# Patient Record
Sex: FEMALE | Race: WHITE | NOT HISPANIC OR LATINO | Employment: PART TIME | URBAN - METROPOLITAN AREA
[De-identification: names, ages, dates, MRNs, and addresses within clinical notes are randomized per-mention and may not be internally consistent; named-entity substitution may affect disease eponyms.]

---

## 2017-05-08 LAB — POC BETA-HCG (QUAL): NEGATIVE

## 2018-07-09 LAB — POC BETA-HCG (QUAL): NEGATIVE

## 2018-07-10 ENCOUNTER — HISTORICAL (OUTPATIENT)
Dept: LAB | Facility: HOSPITAL | Age: 23
End: 2018-07-10

## 2019-06-26 ENCOUNTER — HISTORICAL (OUTPATIENT)
Dept: ADMINISTRATIVE | Facility: HOSPITAL | Age: 24
End: 2019-06-26

## 2019-07-02 LAB — FINAL CULTURE: NORMAL

## 2019-07-08 ENCOUNTER — HISTORICAL (OUTPATIENT)
Dept: ADMINISTRATIVE | Facility: HOSPITAL | Age: 24
End: 2019-07-08

## 2019-07-10 LAB — FINAL CULTURE: NORMAL

## 2020-07-22 ENCOUNTER — HISTORICAL (OUTPATIENT)
Dept: ADMINISTRATIVE | Facility: HOSPITAL | Age: 25
End: 2020-07-22

## 2020-07-24 LAB — FINAL CULTURE: NORMAL

## 2021-02-10 ENCOUNTER — HISTORICAL (OUTPATIENT)
Dept: ADMINISTRATIVE | Facility: HOSPITAL | Age: 26
End: 2021-02-10

## 2021-02-11 LAB — FINAL CULTURE: NORMAL

## 2022-04-11 ENCOUNTER — HISTORICAL (OUTPATIENT)
Dept: ADMINISTRATIVE | Facility: HOSPITAL | Age: 27
End: 2022-04-11

## 2022-04-27 VITALS
HEIGHT: 62 IN | SYSTOLIC BLOOD PRESSURE: 128 MMHG | DIASTOLIC BLOOD PRESSURE: 89 MMHG | BODY MASS INDEX: 52.54 KG/M2 | WEIGHT: 285.5 LBS | OXYGEN SATURATION: 99 %

## 2022-06-16 ENCOUNTER — HOSPITAL ENCOUNTER (OUTPATIENT)
Dept: RADIOLOGY | Facility: HOSPITAL | Age: 27
Discharge: HOME | End: 2022-06-16
Attending: OBSTETRICS & GYNECOLOGY
Payer: MEDICAID

## 2022-06-16 DIAGNOSIS — R10.2 PELVIC PAIN: ICD-10-CM

## 2022-06-16 PROCEDURE — 76856 US EXAM PELVIC COMPLETE: CPT | Mod: TC

## 2022-09-21 ENCOUNTER — HISTORICAL (OUTPATIENT)
Dept: ADMINISTRATIVE | Facility: HOSPITAL | Age: 27
End: 2022-09-21
Payer: MEDICAID

## 2022-11-05 ENCOUNTER — HOSPITAL ENCOUNTER (EMERGENCY)
Facility: HOSPITAL | Age: 27
Discharge: HOME | End: 2022-11-05
Attending: STUDENT IN AN ORGANIZED HEALTH CARE EDUCATION/TRAINING PROGRAM
Payer: MEDICAID

## 2022-11-05 VITALS
DIASTOLIC BLOOD PRESSURE: 81 MMHG | SYSTOLIC BLOOD PRESSURE: 133 MMHG | RESPIRATION RATE: 18 BRPM | TEMPERATURE: 98 F | BODY MASS INDEX: 39.07 KG/M2 | WEIGHT: 215 LBS | HEART RATE: 89 BPM | OXYGEN SATURATION: 99 %

## 2022-11-05 DIAGNOSIS — K08.89 PAIN, DENTAL: Primary | ICD-10-CM

## 2022-11-05 PROCEDURE — 25000003 PHARM REV CODE 250: Performed by: STUDENT IN AN ORGANIZED HEALTH CARE EDUCATION/TRAINING PROGRAM

## 2022-11-05 PROCEDURE — 99284 EMERGENCY DEPT VISIT MOD MDM: CPT

## 2022-11-05 RX ORDER — CHLORHEXIDINE GLUCONATE ORAL RINSE 1.2 MG/ML
15 SOLUTION DENTAL 2 TIMES DAILY
Qty: 473 ML | Refills: 0 | Status: SHIPPED | OUTPATIENT
Start: 2022-11-05 | End: 2022-11-12

## 2022-11-05 RX ORDER — TRAMADOL HYDROCHLORIDE 50 MG/1
50 TABLET ORAL
Status: COMPLETED | OUTPATIENT
Start: 2022-11-05 | End: 2022-11-05

## 2022-11-05 RX ORDER — CLINDAMYCIN HYDROCHLORIDE 150 MG/1
300 CAPSULE ORAL
Status: COMPLETED | OUTPATIENT
Start: 2022-11-05 | End: 2022-11-05

## 2022-11-05 RX ORDER — AMOXICILLIN AND CLAVULANATE POTASSIUM 875; 125 MG/1; MG/1
1 TABLET, FILM COATED ORAL ONCE
Status: DISCONTINUED | OUTPATIENT
Start: 2022-11-05 | End: 2022-11-05

## 2022-11-05 RX ORDER — AMOXICILLIN AND CLAVULANATE POTASSIUM 875; 125 MG/1; MG/1
1 TABLET, FILM COATED ORAL EVERY 12 HOURS
Qty: 14 TABLET | Refills: 0 | Status: SHIPPED | OUTPATIENT
Start: 2022-11-05 | End: 2022-11-05 | Stop reason: ALTCHOICE

## 2022-11-05 RX ORDER — TRAMADOL HYDROCHLORIDE 50 MG/1
50 TABLET ORAL EVERY 6 HOURS PRN
Qty: 7 TABLET | Refills: 0 | OUTPATIENT
Start: 2022-11-05 | End: 2024-02-10

## 2022-11-05 RX ORDER — CLINDAMYCIN HYDROCHLORIDE 150 MG/1
450 CAPSULE ORAL EVERY 8 HOURS
Qty: 63 CAPSULE | Refills: 0 | Status: SHIPPED | OUTPATIENT
Start: 2022-11-05 | End: 2022-11-12

## 2022-11-05 RX ADMIN — TRAMADOL HYDROCHLORIDE 50 MG: 50 TABLET, COATED ORAL at 08:11

## 2022-11-05 RX ADMIN — CLINDAMYCIN HYDROCHLORIDE 300 MG: 150 CAPSULE ORAL at 08:11

## 2022-11-06 NOTE — ED PROVIDER NOTES
Encounter Date: 11/5/2022       History     Chief Complaint   Patient presents with    Dental Pain     Pt complaining of left side dental pain with facial swelling.      Patient is a 27-year-old white female past medical history of asthma presented to the ER today due to dental pain.  Patient states has been intermittently happening for the last 1 year.  Patient is not seated numbness in 5 years.  Patient states the pain is unbearable for the last 3 days.  Patient has been using Orajel with no relief.  Patient denies any notable fevers, chills, cough, congestion, chest pain, shortness of breath, abdominal pain.  Patient states the area affected his left lower mandible.    Review of patient's allergies indicates:   Allergen Reactions    Ceftibuten Swelling    Ceftriaxone Swelling     Other reaction(s): Swelling    Shellfish containing products Swelling    Strawberry Swelling    Peanut     Soy     Cefprozil Rash    Iodine Rash     No past medical history on file.  Past Surgical History:   Procedure Laterality Date    CHOLECYSTECTOMY       No family history on file.  Social History     Tobacco Use    Smoking status: Never    Smokeless tobacco: Never     Review of Systems   Constitutional:  Negative for chills, fatigue and fever.   HENT:  Positive for dental problem. Negative for congestion, sore throat and trouble swallowing.    Eyes:  Negative for pain and visual disturbance.   Respiratory:  Negative for cough, shortness of breath and wheezing.    Cardiovascular:  Negative for chest pain and palpitations.   Gastrointestinal:  Negative for abdominal pain, blood in stool, constipation, diarrhea, nausea and vomiting.   Genitourinary:  Negative for dysuria and hematuria.   Musculoskeletal:  Negative for back pain and myalgias.   Skin:  Negative for rash and wound.   Neurological:  Negative for seizures, syncope and headaches.   Psychiatric/Behavioral:  Negative for confusion. The patient is not nervous/anxious.       Physical Exam     Initial Vitals [11/05/22 2017]   BP Pulse Resp Temp SpO2   133/81 89 18 97.9 °F (36.6 °C) 99 %      MAP       --         Physical Exam    Nursing note and vitals reviewed.  Constitutional: She appears well-developed and well-nourished. She is not diaphoretic. No distress.   HENT:   Head: Normocephalic.   Right Ear: External ear normal.   Left Ear: External ear normal.   Nose: Nose normal.   Mouth:  Slightly erythematous gums below the left inferior posterior mandible.  No notable fluctuance on exam.  No cervical adenopathy.   Eyes: Conjunctivae and EOM are normal. Right eye exhibits no discharge. Left eye exhibits no discharge. No scleral icterus.   Neck:   Normal range of motion.  Cardiovascular:  Normal rate, regular rhythm and normal heart sounds.     Exam reveals no gallop and no friction rub.       No murmur heard.  Pulmonary/Chest: Breath sounds normal. No stridor. No respiratory distress. She has no wheezes. She has no rhonchi. She has no rales.   Musculoskeletal:         General: No tenderness or edema. Normal range of motion.      Cervical back: Normal range of motion.     Neurological: She is alert. No cranial nerve deficit.   Skin: Skin is warm. No rash noted. No erythema.   Psychiatric: She has a normal mood and affect. Her behavior is normal.       ED Course   Procedures  Labs Reviewed - No data to display       Imaging Results    None          Medications   clindamycin capsule 300 mg (has no administration in time range)   traMADoL tablet 50 mg (50 mg Oral Given 11/5/22 2042)     Medical Decision Making:   Initial Assessment:   Overall well-appearing 27-year-old female  Differential Diagnosis:   Dental pain, dental caries, dental abscess  ED Management:  Vital signs stable patient is afebrile   Findings on physical exam seem most consistent with a dental infection   Patient has a ride home and requesting pain medicine   Patient states no way that she is pregnant understands the  risk associated with it   Tramadol given here in the ER as well as clindamycin  There is a cephalosporin allergy in the chart patient denies this has ever been the case  Patient also states that she has had Augmentin in the past no allergic reactions   Went back and discussed with patient her allergys and she is unsure if she ever had an Abx, switched to clindamycin  Advised her not to  augmentin at pharmacy, and she agreed  clinda sent pharmacy as well as Peridex   Seven tramadol tablet sent to the pharmacy as well  Follow-up with dentist on Monday as recommended   Return precautions were discussed                        Clinical Impression:   Final diagnoses:  [K08.89] Pain, dental (Primary)        ED Disposition Condition    Discharge Stable          ED Prescriptions       Medication Sig Dispense Start Date End Date Auth. Provider    amoxicillin-clavulanate 875-125mg (AUGMENTIN) 875-125 mg per tablet  (Status: Discontinued) Take 1 tablet by mouth every 12 (twelve) hours. for 7 days 14 tablet 11/5/2022 11/5/2022 James Fry MD    chlorhexidine (PERIDEX) 0.12 % solution Use as directed 15 mLs in the mouth or throat 2 (two) times daily. for 7 days 473 mL 11/5/2022 11/12/2022 James Fry MD    traMADoL (ULTRAM) 50 mg tablet Take 1 tablet (50 mg total) by mouth every 6 (six) hours as needed for Pain. 7 tablet 11/5/2022 -- James Fry MD    clindamycin (CLEOCIN) 150 MG capsule Take 3 capsules (450 mg total) by mouth every 8 (eight) hours. for 7 days 63 capsule 11/5/2022 11/12/2022 James Fry MD          Follow-up Information       Follow up With Specialties Details Why Contact Info    [facility] - Emergency Dept Emergency Medicine  If symptoms worsen [address] [facility] [address] [phone]    Sumi Santos MD Internal Medicine Schedule an appointment as soon as possible for a visit   [address] [place] [address]  [place] [address]  [phone]                James Fry MD  11/05/22 2040       James Fry MD  11/05/22 2050

## 2023-06-06 ENCOUNTER — HOSPITAL ENCOUNTER (EMERGENCY)
Facility: HOSPITAL | Age: 28
Discharge: HOME | End: 2023-06-06
Attending: SPECIALIST
Payer: MEDICAID

## 2023-06-06 VITALS
HEIGHT: 62 IN | BODY MASS INDEX: 39.56 KG/M2 | HEART RATE: 100 BPM | DIASTOLIC BLOOD PRESSURE: 90 MMHG | SYSTOLIC BLOOD PRESSURE: 149 MMHG | WEIGHT: 215 LBS | TEMPERATURE: 97 F | OXYGEN SATURATION: 98 % | RESPIRATION RATE: 20 BRPM

## 2023-06-06 DIAGNOSIS — S60.222A CONTUSION OF LEFT HAND, INITIAL ENCOUNTER: Primary | ICD-10-CM

## 2023-06-06 PROCEDURE — 99283 EMERGENCY DEPT VISIT LOW MDM: CPT

## 2023-06-06 PROCEDURE — 25000003 PHARM REV CODE 250

## 2023-06-06 RX ORDER — HYDROCODONE BITARTRATE AND ACETAMINOPHEN 10; 325 MG/1; MG/1
1 TABLET ORAL
Status: COMPLETED | OUTPATIENT
Start: 2023-06-06 | End: 2023-06-06

## 2023-06-06 RX ORDER — DICLOFENAC SODIUM 50 MG/1
50 TABLET, DELAYED RELEASE ORAL 2 TIMES DAILY
Qty: 10 TABLET | Refills: 0 | OUTPATIENT
Start: 2023-06-06 | End: 2024-02-10

## 2023-06-06 RX ORDER — ONDANSETRON 4 MG/1
4 TABLET, ORALLY DISINTEGRATING ORAL
Status: COMPLETED | OUTPATIENT
Start: 2023-06-06 | End: 2023-06-06

## 2023-06-06 RX ADMIN — HYDROCODONE BITARTRATE AND ACETAMINOPHEN 1 TABLET: 10; 325 TABLET ORAL at 08:06

## 2023-06-06 RX ADMIN — ONDANSETRON 4 MG: 4 TABLET, ORALLY DISINTEGRATING ORAL at 08:06

## 2023-06-07 NOTE — DISCHARGE INSTRUCTIONS
Patient to be discharged home keep hand red apply cool compress.  Anti-inflammatories for pain.  Elevate and rest.  Keep Ace wrap on for comfort

## 2023-06-07 NOTE — ED PROVIDER NOTES
Encounter Date: 6/6/2023       History     Chief Complaint   Patient presents with    Hand Pain     Left hand pain after dog leash got caught in it as dog was running     Patient reports ER with complaints of left hand pain    The history is provided by the patient. No  was used.   Review of patient's allergies indicates:   Allergen Reactions    Ceftibuten Swelling    Ceftriaxone Swelling     Other reaction(s): Swelling    Shellfish containing products Swelling    Strawberry Swelling    Peanut     Soy     Cefprozil Rash    Iodine Rash     No past medical history on file.  Past Surgical History:   Procedure Laterality Date    CHOLECYSTECTOMY       No family history on file.  Social History     Tobacco Use    Smoking status: Never    Smokeless tobacco: Never     Review of Systems   Constitutional: Negative.    HENT: Negative.     Eyes: Negative.    Respiratory: Negative.     Cardiovascular: Negative.    Gastrointestinal: Negative.    Endocrine: Negative.    Genitourinary: Negative.    Musculoskeletal:  Positive for joint swelling and myalgias.   Skin: Negative.    Allergic/Immunologic: Negative.    Neurological: Negative.    Hematological: Negative.    Psychiatric/Behavioral: Negative.     All other systems reviewed and are negative.    Physical Exam     Initial Vitals [06/06/23 2000]   BP Pulse Resp Temp SpO2   (!) 149/90 100 18 97.2 °F (36.2 °C) 98 %      MAP       --         Physical Exam    Nursing note and vitals reviewed.  Constitutional: She appears well-developed and well-nourished.   HENT:   Head: Normocephalic and atraumatic.   Right Ear: External ear normal.   Left Ear: External ear normal.   Nose: Nose normal.   Mouth/Throat: Oropharynx is clear and moist.   Eyes: Conjunctivae and EOM are normal. Pupils are equal, round, and reactive to light.   Neck: Neck supple.   Normal range of motion.  Cardiovascular:  Normal rate, regular rhythm, normal heart sounds and intact distal pulses.            Pulmonary/Chest: Breath sounds normal.   Abdominal: Abdomen is soft. Bowel sounds are normal.   Musculoskeletal:         General: Tenderness present.      Cervical back: Normal range of motion and neck supple.     Neurological: She is alert and oriented to person, place, and time. She has normal strength and normal reflexes. GCS score is 15. GCS eye subscore is 4. GCS verbal subscore is 5. GCS motor subscore is 6.   Skin: Skin is warm. Capillary refill takes less than 2 seconds. There is erythema.   Psychiatric: She has a normal mood and affect. Her behavior is normal. Judgment and thought content normal.       ED Course   Procedures  Labs Reviewed - No data to display       Imaging Results              X-Ray Hand 3 view Left (Final result)  Result time 06/06/23 20:18:22      Final result by Brian Jo MD (06/06/23 20:18:22)                   Impression:      No acute osseous abnormality, fracture, or dislocation.    There is no significant degenerative change.      Electronically signed by: Brian Jo  Date:    06/06/2023  Time:    20:18               Narrative:    EXAMINATION:  XR HAND COMPLETE 3 VIEW LEFT    CLINICAL HISTORY:  traum;    TECHNIQUE:  Radiographs of the left hand with AP, lateral and oblique  views.    COMPARISON:  No prior imaging available for comparison    FINDINGS:  There is no acute fracture, subluxation or dislocation.    Joints and interspaces appear maintained.    Osseous structures show normal bone mineral density.    Soft tissues are unremarkable.    There are no radiopaque foreign bodies.                                       Medications   HYDROcodone-acetaminophen  mg per tablet 1 tablet (1 tablet Oral Given 6/6/23 2023)   ondansetron disintegrating tablet 4 mg (4 mg Oral Given 6/6/23 2023)     Medical Decision Making:   Initial Assessment:   Awake alert oriented 28-year-old female presents emergency room with complaints of left hand pain significantly the left ring  finger after patient was walking dog and the dog ran and the leash wrapped her hand.   Patient is no acute distress.  Ambulatory gait steady no use of accessory devices.  The left hand is lightly swollen wore than the right especially on the palmar side.  Fall abrasions noted to the metatarsal the left hand.  No bruising open wounds bleeding.  Some soft tissue swelling.  No obvious deformity no tenderness with palpation to the scaphoid area.  Patient is able to fan fingers and perform finger-to-finger touch.  Distal pulse is present.  GCS of 15 neuro focal intact  Differential Diagnosis:   Hand sprain  Hand contusion   Fracture  Clinical Tests:   Radiological Study: Ordered  ED Management:  Xray:  No acute osseous abnormality, fracture, or dislocation.     There is no significant degenerative change.  Patient will be discharged home to follow up with orthopedist if symptoms continue.                          Clinical Impression:   Final diagnoses:  [S60.222A] Contusion of left hand, initial encounter (Primary)        ED Disposition Condition    Discharge Stable          ED Prescriptions       Medication Sig Dispense Start Date End Date Auth. Provider    diclofenac (VOLTAREN) 50 MG EC tablet Take 1 tablet (50 mg total) by mouth 2 (two) times daily. 10 tablet 6/6/2023 -- Ivet Jensen NP          Follow-up Information       Follow up With Specialties Details Why Contact Info    Sumi Santos MD Internal Medicine  As needed [address] [place] [address]  [phone]               Ivet Jensen NP  06/06/23 2042

## 2023-06-28 ENCOUNTER — HOSPITAL ENCOUNTER (EMERGENCY)
Facility: HOSPITAL | Age: 28
Discharge: HOME | End: 2023-06-28
Attending: STUDENT IN AN ORGANIZED HEALTH CARE EDUCATION/TRAINING PROGRAM
Payer: MEDICAID

## 2023-06-28 VITALS
HEART RATE: 78 BPM | WEIGHT: 215 LBS | HEIGHT: 62 IN | OXYGEN SATURATION: 98 % | RESPIRATION RATE: 18 BRPM | DIASTOLIC BLOOD PRESSURE: 80 MMHG | BODY MASS INDEX: 39.56 KG/M2 | TEMPERATURE: 98 F | SYSTOLIC BLOOD PRESSURE: 138 MMHG

## 2023-06-28 DIAGNOSIS — S16.1XXA STRAIN OF NECK MUSCLE, INITIAL ENCOUNTER: Primary | ICD-10-CM

## 2023-06-28 DIAGNOSIS — N30.00 ACUTE CYSTITIS WITHOUT HEMATURIA: ICD-10-CM

## 2023-06-28 DIAGNOSIS — R52 PAIN: ICD-10-CM

## 2023-06-28 LAB
APPEARANCE UR: CLEAR
B-HCG SERPL QL: NEGATIVE
BACTERIA #/AREA URNS AUTO: ABNORMAL /HPF
BILIRUB UR QL STRIP.AUTO: NEGATIVE MG/DL
COLOR UR: YELLOW
GLUCOSE UR QL STRIP.AUTO: NEGATIVE MG/DL
KETONES UR QL STRIP.AUTO: NEGATIVE MG/DL
LEUKOCYTE ESTERASE UR QL STRIP.AUTO: ABNORMAL UNIT/L
NITRITE UR QL STRIP.AUTO: NEGATIVE
PH UR STRIP.AUTO: 6 [PH]
PROT UR QL STRIP.AUTO: ABNORMAL MG/DL
RBC #/AREA URNS AUTO: ABNORMAL /HPF
RBC UR QL AUTO: NEGATIVE UNIT/L
SP GR UR STRIP.AUTO: >=1.03
SQUAMOUS #/AREA URNS AUTO: ABNORMAL /HPF
UROBILINOGEN UR STRIP-ACNC: 0.2 MG/DL
WBC #/AREA URNS AUTO: ABNORMAL /HPF

## 2023-06-28 PROCEDURE — 63600175 PHARM REV CODE 636 W HCPCS: Performed by: STUDENT IN AN ORGANIZED HEALTH CARE EDUCATION/TRAINING PROGRAM

## 2023-06-28 PROCEDURE — 81001 URINALYSIS AUTO W/SCOPE: CPT | Performed by: STUDENT IN AN ORGANIZED HEALTH CARE EDUCATION/TRAINING PROGRAM

## 2023-06-28 PROCEDURE — 99284 EMERGENCY DEPT VISIT MOD MDM: CPT

## 2023-06-28 PROCEDURE — 81025 URINE PREGNANCY TEST: CPT | Performed by: STUDENT IN AN ORGANIZED HEALTH CARE EDUCATION/TRAINING PROGRAM

## 2023-06-28 PROCEDURE — 96372 THER/PROPH/DIAG INJ SC/IM: CPT | Performed by: STUDENT IN AN ORGANIZED HEALTH CARE EDUCATION/TRAINING PROGRAM

## 2023-06-28 RX ORDER — KETOROLAC TROMETHAMINE 10 MG/1
10 TABLET, FILM COATED ORAL 3 TIMES DAILY
Qty: 15 TABLET | Refills: 0 | Status: SHIPPED | OUTPATIENT
Start: 2023-06-28 | End: 2023-07-03

## 2023-06-28 RX ORDER — KETOROLAC TROMETHAMINE 30 MG/ML
30 INJECTION, SOLUTION INTRAMUSCULAR; INTRAVENOUS
Status: COMPLETED | OUTPATIENT
Start: 2023-06-28 | End: 2023-06-28

## 2023-06-28 RX ORDER — NITROFURANTOIN 25; 75 MG/1; MG/1
100 CAPSULE ORAL 2 TIMES DAILY
Qty: 14 CAPSULE | Refills: 0 | Status: SHIPPED | OUTPATIENT
Start: 2023-06-28 | End: 2023-07-05

## 2023-06-28 RX ORDER — METHOCARBAMOL 750 MG/1
750 TABLET, FILM COATED ORAL 3 TIMES DAILY PRN
Qty: 15 TABLET | Refills: 0 | Status: SHIPPED | OUTPATIENT
Start: 2023-06-28 | End: 2023-07-03

## 2023-06-28 RX ADMIN — KETOROLAC TROMETHAMINE 30 MG: 30 INJECTION, SOLUTION INTRAMUSCULAR; INTRAVENOUS at 11:06

## 2023-06-28 NOTE — ED PROVIDER NOTES
Encounter Date: 6/28/2023       History     Chief Complaint   Patient presents with    Back Pain     Pt reports pain from top of neck down to lower back for one week     Patient is a 28-year-old white female past medical history of asthma who presented to the ER today due to neck pain for the last 1 week.  Patient states that it was present before that as well that is states that it seemed to worsen over the last 1 week.  Patient states she is taken only Tylenol for without much relief.  Patient denies any interval trauma.  Patient states the pain is worse with movement and better with rest.  Patient states it is a burning/sharp pain.  Patient states that often times radiates down or mid back.  Patient denies any fevers, chills, cough, congestion, chest pain, shortness of breath abdominal pain.  Patient denies any dysuria or hematuria.  Patient states she is not been evaluated for this in the past.    Review of patient's allergies indicates:   Allergen Reactions    Ceftibuten Swelling    Ceftriaxone Swelling     Other reaction(s): Swelling    Shellfish containing products Swelling    Strawberry Swelling    Peanut     Soy     Cefprozil Rash    Iodine Rash     No past medical history on file.  Past Surgical History:   Procedure Laterality Date    CHOLECYSTECTOMY       No family history on file.  Social History     Tobacco Use    Smoking status: Never    Smokeless tobacco: Never     Review of Systems   Constitutional:  Negative for chills, fatigue and fever.   HENT:  Negative for congestion, sore throat and trouble swallowing.    Eyes:  Negative for pain and visual disturbance.   Respiratory:  Negative for cough, shortness of breath and wheezing.    Cardiovascular:  Negative for chest pain and palpitations.   Gastrointestinal:  Negative for abdominal pain, blood in stool, constipation, diarrhea, nausea and vomiting.   Genitourinary:  Negative for dysuria and hematuria.   Musculoskeletal:  Positive for back pain and  neck pain. Negative for myalgias.   Skin:  Negative for rash and wound.   Neurological:  Negative for seizures, syncope and headaches.   Psychiatric/Behavioral:  Negative for confusion. The patient is not nervous/anxious.      Physical Exam     Initial Vitals [06/28/23 1019]   BP Pulse Resp Temp SpO2   138/80 78 18 98.4 °F (36.9 °C) 98 %      MAP       --         Physical Exam    Nursing note and vitals reviewed.  Constitutional: She appears well-developed and well-nourished. She is not diaphoretic. No distress.   HENT:   Head: Normocephalic.   Right Ear: External ear normal.   Left Ear: External ear normal.   Nose: Nose normal.   Eyes: Conjunctivae and EOM are normal. Right eye exhibits no discharge. Left eye exhibits no discharge. No scleral icterus.   Neck:   Normal range of motion.  Cardiovascular:  Normal rate, regular rhythm and normal heart sounds.     Exam reveals no gallop and no friction rub.       No murmur heard.  Pulmonary/Chest: Breath sounds normal. No stridor. No respiratory distress. She has no wheezes. She has no rhonchi. She has no rales.   Abdominal: Abdomen is soft. She exhibits no distension. There is no abdominal tenderness. There is no rebound and no guarding.   Musculoskeletal:         General: Normal range of motion.      Cervical back: Normal range of motion.      Comments: There is no C, T, L-spine tenderness no step-off lesions.  Tenderness palpation in the cervical paraspinal regions but also in the thoracic paraspinal regions.  No rashes on exam.  No signs of trauma on exam.  No nuchal rigidity on exam.     Neurological: She is alert.   Skin: Skin is warm. No rash noted. No erythema.   Psychiatric: She has a normal mood and affect. Her behavior is normal.       ED Course   Procedures  Labs Reviewed   URINALYSIS, REFLEX TO URINE CULTURE - Abnormal; Notable for the following components:       Result Value    Protein, UA Trace (*)     Leukocyte Esterase, UA Trace (*)     All other  components within normal limits   URINALYSIS, MICROSCOPIC - Abnormal; Notable for the following components:    Bacteria, UA Few (*)     Squamous Epithelial Cells, UA Few (*)     All other components within normal limits   HCG QUALITATIVE URINE - Normal          Imaging Results              X-Ray Cervical Spine AP And Lateral (Final result)  Result time 06/28/23 12:08:48      Final result by Sheldon Camilo MD (06/28/23 12:08:48)                   Impression:      No acute radiographic findings.      Electronically signed by: Sheldon Camilo  Date:    06/28/2023  Time:    12:08               Narrative:    EXAMINATION:  XR CERVICAL SPINE AP LATERAL    CLINICAL HISTORY:  Pain, unspecified    COMPARISON:  No priors    FINDINGS:  Frontal, lateral and open mouth views of the cervical spine. There is no significant alignment abnormality.  The odontoid appears grossly intact and the C1-C2 relationship normal on the open mouth view. There is no prevertebral soft tissue swelling.                                       Medications   ketorolac injection 30 mg (30 mg Intramuscular Given 6/28/23 1132)     Medical Decision Making:   Initial Assessment:   Overall well-appearing 28-year-old female no acute distress seems to have full range of motion on exam of her neck.  Differential Diagnosis:   Cervical strain, fracture, radiculopathy, zoster, rash, meningitis  Clinical Tests:   Lab Tests: Ordered and Reviewed  The following lab test(s) were unremarkable: UPT and Urinalysis  Radiological Study: Ordered and Reviewed  ED Management:  Vital signs stable patient is afebrile  Denies all red flag symptoms of back pain  No rashes on exam   No nuchal rigidity on exam   X-ray showed no acute osseous abnormalities   Do suspect cervical muscle strain   Rice therapy advised   Toradol given and patient states he feels some improvement   Robaxin and Toradol sent to pharmacy  UA somewhat concerning for UTI as is leukocyte and bacteria thus Macrobid  sent pharmacy   All questions were answered in layman's terms and return precautions were discussed                          Clinical Impression:   Final diagnoses:  [R52] Pain  [S16.1XXA] Strain of neck muscle, initial encounter (Primary)  [N30.00] Acute cystitis without hematuria        ED Disposition Condition    Discharge Stable          ED Prescriptions       Medication Sig Dispense Start Date End Date Auth. Provider    methocarbamoL (ROBAXIN) 750 MG Tab Take 1 tablet (750 mg total) by mouth 3 (three) times daily as needed (pain). 15 tablet 6/28/2023 7/3/2023 James Fry MD    ketorolac (TORADOL) 10 mg tablet Take 1 tablet (10 mg total) by mouth 3 (three) times daily. for 5 days 15 tablet 6/28/2023 7/3/2023 James Fry MD    nitrofurantoin, macrocrystal-monohydrate, (MACROBID) 100 MG capsule Take 1 capsule (100 mg total) by mouth 2 (two) times daily. for 7 days 14 capsule 6/28/2023 7/5/2023 James Fry MD          Follow-up Information       Follow up With Specialties Details Why Contact Info    [facility] - Emergency Dept Emergency Medicine  If symptoms worsen [address] [facility] [address] [phone]    Sumi Santos MD Internal Medicine Schedule an appointment as soon as possible for a visit   [address] [place] [address] Rd  [place] [address]  [phone]               James Fry MD  06/28/23 3337

## 2024-02-10 ENCOUNTER — HOSPITAL ENCOUNTER (EMERGENCY)
Facility: HOSPITAL | Age: 29
Discharge: HOME | End: 2024-02-10
Attending: FAMILY MEDICINE
Payer: MEDICAID

## 2024-02-10 VITALS
DIASTOLIC BLOOD PRESSURE: 93 MMHG | HEIGHT: 62 IN | RESPIRATION RATE: 18 BRPM | OXYGEN SATURATION: 97 % | BODY MASS INDEX: 40.48 KG/M2 | WEIGHT: 220 LBS | SYSTOLIC BLOOD PRESSURE: 146 MMHG | HEART RATE: 121 BPM | TEMPERATURE: 100 F

## 2024-02-10 DIAGNOSIS — G89.29 CHRONIC PAIN OF RIGHT KNEE: Primary | ICD-10-CM

## 2024-02-10 DIAGNOSIS — M25.561 CHRONIC PAIN OF RIGHT KNEE: Primary | ICD-10-CM

## 2024-02-10 PROCEDURE — 99284 EMERGENCY DEPT VISIT MOD MDM: CPT | Mod: 25

## 2024-02-10 PROCEDURE — 96372 THER/PROPH/DIAG INJ SC/IM: CPT | Performed by: FAMILY MEDICINE

## 2024-02-10 PROCEDURE — 63600175 PHARM REV CODE 636 W HCPCS: Performed by: FAMILY MEDICINE

## 2024-02-10 RX ORDER — KETOROLAC TROMETHAMINE 30 MG/ML
60 INJECTION, SOLUTION INTRAMUSCULAR; INTRAVENOUS
Status: COMPLETED | OUTPATIENT
Start: 2024-02-10 | End: 2024-02-10

## 2024-02-10 RX ORDER — DEXAMETHASONE SODIUM PHOSPHATE 4 MG/ML
4 INJECTION, SOLUTION INTRA-ARTICULAR; INTRALESIONAL; INTRAMUSCULAR; INTRAVENOUS; SOFT TISSUE
Status: COMPLETED | OUTPATIENT
Start: 2024-02-10 | End: 2024-02-10

## 2024-02-10 RX ORDER — KETOROLAC TROMETHAMINE 30 MG/ML
60 INJECTION, SOLUTION INTRAMUSCULAR; INTRAVENOUS
Status: DISCONTINUED | OUTPATIENT
Start: 2024-02-10 | End: 2024-02-10

## 2024-02-10 RX ORDER — KETOROLAC TROMETHAMINE 10 MG/1
10 TABLET, FILM COATED ORAL EVERY 6 HOURS
Qty: 15 TABLET | Refills: 0 | Status: SHIPPED | OUTPATIENT
Start: 2024-02-10 | End: 2024-02-15

## 2024-02-10 RX ADMIN — KETOROLAC TROMETHAMINE 60 MG: 30 INJECTION, SOLUTION INTRAMUSCULAR at 10:02

## 2024-02-10 RX ADMIN — DEXAMETHASONE SODIUM PHOSPHATE 4 MG: 4 INJECTION, SOLUTION INTRA-ARTICULAR; INTRALESIONAL; INTRAMUSCULAR; INTRAVENOUS; SOFT TISSUE at 10:02

## 2024-02-10 NOTE — ED PROVIDER NOTES
Encounter Date: 2/10/2024       History     Chief Complaint   Patient presents with    Knee Pain     Pt has right knee pain. States she initially injured knee in 2021 and was supposed to have surgery on soft tissue but her work [employer] compensation ran out.  She took a muscle relaxer last night without relief. Describes as burning pain in knee.      28-year-old complains of right knee pain in his he hurt it in 2021 at work and has been having pain ever since has been having trouble getting follow up appointments with the worker's comp denies any new injuries she just requesting something to help with the pain her vital signs were stable slightly tachycardic most likely from the pain on exam there was no edema she does have some tenderness to palpation to the inside of the knee around the MCL no fluid that I can tell on exam it did not feel it is any need to x-ray at this point as there is no new injury this is a chronic injury which he is currently seeking help for discussed treatment options for chronic pain with the patient we will put her on some crutches just to take the weight off of it for now I would recommend she follows up with physical therapy would recommend weight loss as this would help a lot with the pain and we will give a prescription for some anti-inflammatories for a week until she can follow up with the PCP        Review of patient's allergies indicates:   Allergen Reactions    Ceftibuten Swelling    Ceftriaxone Swelling     Other reaction(s): Swelling    Shellfish containing products Swelling    Strawberry Swelling    Peanut     Soy     Cefprozil Rash    Iodine Rash     History reviewed. No pertinent past medical history.  Past Surgical History:   Procedure Laterality Date    CHOLECYSTECTOMY       History reviewed. No pertinent family history.  Social History     Tobacco Use    Smoking status: Never    Smokeless tobacco: Never     Review of Systems   Constitutional:  Negative for fever.   HENT:   Negative for sore throat.    Respiratory:  Negative for shortness of breath.    Cardiovascular:  Negative for chest pain.   Gastrointestinal:  Negative for nausea.   Genitourinary:  Negative for dysuria.   Musculoskeletal:  Negative for back pain.        Knee pain   Skin:  Negative for rash.   Neurological:  Negative for weakness.   Hematological:  Does not bruise/bleed easily.   All other systems reviewed and are negative.      Physical Exam     Initial Vitals [02/10/24 0948]   BP Pulse Resp Temp SpO2   (!) 146/93 (!) 121 18 99.7 °F (37.6 °C) 97 %      MAP       --         Physical Exam    Nursing note and vitals reviewed.  Constitutional: She appears well-developed and well-nourished. She is active.   HENT:   Head: Normocephalic and atraumatic.   Eyes: Conjunctivae, EOM and lids are normal. Pupils are equal, round, and reactive to light.   Neck: Trachea normal and phonation normal. Neck supple. No thyroid mass present.   Normal range of motion.  Cardiovascular:  Normal rate, regular rhythm, normal heart sounds and normal pulses.           Pulmonary/Chest: Breath sounds normal.   Musculoskeletal:         General: Tenderness present.      Cervical back: Normal range of motion and neck supple.     Neurological: She is alert and oriented to person, place, and time. She has normal strength and normal reflexes.   Skin: Skin is warm and intact.   Psychiatric: She has a normal mood and affect. Her speech is normal and behavior is normal. Judgment and thought content normal. Cognition and memory are normal.         ED Course   Procedures  Labs Reviewed - No data to display       Imaging Results    None          Medications   dexAMETHasone injection 4 mg (has no administration in time range)   ketorolac injection 60 mg (has no administration in time range)     Medical Decision Making  28-year-old complains of right knee pain in his he hurt it in 2021 at work and has been having pain ever since has been having trouble  getting follow up appointments with the worker's comp denies any new injuries she just requesting something to help with the pain her vital signs were stable slightly tachycardic most likely from the pain on exam there was no edema she does have some tenderness to palpation to the inside of the knee around the MCL no fluid that I can tell on exam it did not feel it is any need to x-ray at this point as there is no new injury this is a chronic injury which he is currently seeking help for discussed treatment options for chronic pain with the patient we will put her on some crutches just to take the weight off of it for now I would recommend she follows up with physical therapy would recommend weight loss as this would help a lot with the pain and we will give a prescription for some anti-inflammatories for a week until she can follow up with the PCP          Risk  Prescription drug management.  Risk Details: Differential diagnosis:  Chronic right knee pain, obesity worsening knee pain, acute knee strain, internal derangement, degenerative joint disease, arthritis                                      Clinical Impression:  Final diagnoses:  [M25.561, G89.29] Chronic pain of right knee (Primary)          ED Disposition Condition    Discharge Stable          ED Prescriptions       Medication Sig Dispense Start Date End Date Auth. Provider    ketorolac (TORADOL) 10 mg tablet Take 1 tablet (10 mg total) by mouth every 6 (six) hours. for 5 days 15 tablet 2/10/2024 2/15/2024 Lon Grant MD          Follow-up Information    None          Lon Grant MD  02/10/24 8890

## 2024-04-26 ENCOUNTER — HOSPITAL ENCOUNTER (OUTPATIENT)
Dept: RADIOLOGY | Facility: HOSPITAL | Age: 29
Discharge: HOME | End: 2024-04-26
Attending: OBSTETRICS & GYNECOLOGY
Payer: MEDICAID

## 2024-04-26 DIAGNOSIS — R10.2 PELVIC PAIN: ICD-10-CM

## 2024-04-26 DIAGNOSIS — N94.10 PAINFUL COITUS, FEMALE: ICD-10-CM

## 2024-04-26 PROCEDURE — 76856 US EXAM PELVIC COMPLETE: CPT | Mod: TC

## 2024-10-03 ENCOUNTER — HOSPITAL ENCOUNTER (EMERGENCY)
Facility: HOSPITAL | Age: 29
Discharge: HOME | End: 2024-10-03
Attending: STUDENT IN AN ORGANIZED HEALTH CARE EDUCATION/TRAINING PROGRAM
Payer: MEDICAID

## 2024-10-03 VITALS
RESPIRATION RATE: 20 BRPM | DIASTOLIC BLOOD PRESSURE: 84 MMHG | HEIGHT: 63 IN | HEART RATE: 89 BPM | BODY MASS INDEX: 48.9 KG/M2 | SYSTOLIC BLOOD PRESSURE: 135 MMHG | TEMPERATURE: 98 F | OXYGEN SATURATION: 99 % | WEIGHT: 276 LBS

## 2024-10-03 DIAGNOSIS — N39.0 URINARY TRACT INFECTION WITHOUT HEMATURIA, SITE UNSPECIFIED: ICD-10-CM

## 2024-10-03 DIAGNOSIS — R10.9 ABDOMINAL PAIN, UNSPECIFIED ABDOMINAL LOCATION: Primary | ICD-10-CM

## 2024-10-03 LAB
ALBUMIN SERPL-MCNC: 3.8 G/DL (ref 3.5–5)
ALBUMIN/GLOB SERPL: 1 RATIO (ref 1.1–2)
ALP SERPL-CCNC: 72 UNIT/L (ref 40–150)
ALT SERPL-CCNC: 17 UNIT/L (ref 0–55)
ANION GAP SERPL CALC-SCNC: 9 MEQ/L
AST SERPL-CCNC: 18 UNIT/L (ref 5–34)
B-HCG UR QL: NEGATIVE
BACTERIA #/AREA URNS AUTO: ABNORMAL /HPF
BASOPHILS # BLD AUTO: 0.05 X10(3)/MCL
BASOPHILS NFR BLD AUTO: 0.5 %
BILIRUB SERPL-MCNC: 0.4 MG/DL
BILIRUB UR QL STRIP.AUTO: NEGATIVE
BUN SERPL-MCNC: 10.6 MG/DL (ref 7–18.7)
CALCIUM SERPL-MCNC: 9.8 MG/DL (ref 8.4–10.2)
CHLORIDE SERPL-SCNC: 108 MMOL/L (ref 98–107)
CLARITY UR: ABNORMAL
CO2 SERPL-SCNC: 24 MMOL/L (ref 22–29)
COLOR UR AUTO: ABNORMAL
CREAT SERPL-MCNC: 0.97 MG/DL (ref 0.55–1.02)
CREAT/UREA NIT SERPL: 11
EOSINOPHIL # BLD AUTO: 0.18 X10(3)/MCL (ref 0–0.9)
EOSINOPHIL NFR BLD AUTO: 1.7 %
ERYTHROCYTE [DISTWIDTH] IN BLOOD BY AUTOMATED COUNT: 13 % (ref 11.5–17)
GFR SERPLBLD CREATININE-BSD FMLA CKD-EPI: >60 ML/MIN/1.73/M2
GLOBULIN SER-MCNC: 3.8 GM/DL (ref 2.4–3.5)
GLUCOSE SERPL-MCNC: 106 MG/DL (ref 74–100)
GLUCOSE UR QL STRIP: NEGATIVE
HCT VFR BLD AUTO: 44 % (ref 37–47)
HGB BLD-MCNC: 14.4 G/DL (ref 12–16)
HGB UR QL STRIP: ABNORMAL
IMM GRANULOCYTES # BLD AUTO: 0.02 X10(3)/MCL (ref 0–0.04)
IMM GRANULOCYTES NFR BLD AUTO: 0.2 %
KETONES UR QL STRIP: ABNORMAL
LEUKOCYTE ESTERASE UR QL STRIP: ABNORMAL
LIPASE SERPL-CCNC: 16 U/L
LYMPHOCYTES # BLD AUTO: 3.64 X10(3)/MCL (ref 0.6–4.6)
LYMPHOCYTES NFR BLD AUTO: 34.6 %
MCH RBC QN AUTO: 29.4 PG (ref 27–31)
MCHC RBC AUTO-ENTMCNC: 32.7 G/DL (ref 33–36)
MCV RBC AUTO: 89.8 FL (ref 80–94)
MONOCYTES # BLD AUTO: 0.72 X10(3)/MCL (ref 0.1–1.3)
MONOCYTES NFR BLD AUTO: 6.8 %
NEUTROPHILS # BLD AUTO: 5.91 X10(3)/MCL (ref 2.1–9.2)
NEUTROPHILS NFR BLD AUTO: 56.2 %
NITRITE UR QL STRIP: POSITIVE
PH UR STRIP: 5 [PH]
PLATELET # BLD AUTO: 258 X10(3)/MCL (ref 130–400)
PMV BLD AUTO: 11.1 FL (ref 7.4–10.4)
POTASSIUM SERPL-SCNC: 3.9 MMOL/L (ref 3.5–5.1)
PROT SERPL-MCNC: 7.6 GM/DL (ref 6.4–8.3)
PROT UR QL STRIP: 30
RBC # BLD AUTO: 4.9 X10(6)/MCL (ref 4.2–5.4)
RBC #/AREA URNS AUTO: >=100 /HPF
SODIUM SERPL-SCNC: 141 MMOL/L (ref 136–145)
SP GR UR STRIP.AUTO: >=1.03 (ref 1–1.03)
SQUAMOUS #/AREA URNS AUTO: ABNORMAL /HPF
UROBILINOGEN UR STRIP-ACNC: 1
WBC # BLD AUTO: 10.52 X10(3)/MCL (ref 4.5–11.5)
WBC #/AREA URNS AUTO: ABNORMAL /HPF

## 2024-10-03 PROCEDURE — 80053 COMPREHEN METABOLIC PANEL: CPT | Performed by: STUDENT IN AN ORGANIZED HEALTH CARE EDUCATION/TRAINING PROGRAM

## 2024-10-03 PROCEDURE — 25000003 PHARM REV CODE 250: Performed by: EMERGENCY MEDICINE

## 2024-10-03 PROCEDURE — 81025 URINE PREGNANCY TEST: CPT | Performed by: STUDENT IN AN ORGANIZED HEALTH CARE EDUCATION/TRAINING PROGRAM

## 2024-10-03 PROCEDURE — 85025 COMPLETE CBC W/AUTO DIFF WBC: CPT | Performed by: STUDENT IN AN ORGANIZED HEALTH CARE EDUCATION/TRAINING PROGRAM

## 2024-10-03 PROCEDURE — 99283 EMERGENCY DEPT VISIT LOW MDM: CPT

## 2024-10-03 PROCEDURE — 81003 URINALYSIS AUTO W/O SCOPE: CPT | Performed by: STUDENT IN AN ORGANIZED HEALTH CARE EDUCATION/TRAINING PROGRAM

## 2024-10-03 PROCEDURE — 83690 ASSAY OF LIPASE: CPT | Performed by: STUDENT IN AN ORGANIZED HEALTH CARE EDUCATION/TRAINING PROGRAM

## 2024-10-03 PROCEDURE — 81001 URINALYSIS AUTO W/SCOPE: CPT | Performed by: STUDENT IN AN ORGANIZED HEALTH CARE EDUCATION/TRAINING PROGRAM

## 2024-10-03 PROCEDURE — 87086 URINE CULTURE/COLONY COUNT: CPT | Performed by: STUDENT IN AN ORGANIZED HEALTH CARE EDUCATION/TRAINING PROGRAM

## 2024-10-03 RX ORDER — CIPROFLOXACIN 500 MG/1
500 TABLET ORAL
Status: COMPLETED | OUTPATIENT
Start: 2024-10-03 | End: 2024-10-03

## 2024-10-03 RX ADMIN — CIPROFLOXACIN HYDROCHLORIDE 500 MG: 500 TABLET, FILM COATED ORAL at 06:10

## 2024-10-03 NOTE — ED PROVIDER NOTES
Encounter Date: 10/3/2024       History     Chief Complaint   Patient presents with    Abdominal Pain     Complains of generalized abdominal pain, vaginal bleeding, and nausea x 3 weeks     Patient is a 29-year-old white female no significant past medical history presented to the ER today due to abdominal pain for the last 2 weeks.  She states that she has been trying to get pregnant and states she missed her cycle which was supposed to be due 4-5 days ago.  She states that she was wearing a bracelet and a necklace that she bought on [employer] to get pregnant.  She states that upon starting wearing these to impok she states that she developed breast tenderness, abdominal discomfort.  She was not tried anything for relief.  Who states she took a pregnancy test 2 days ago with negative results.  He denies any dysuria or hematuria.  She states last bowel movement was this morning which was normal brown in color with no hematochezia or melena.  She states she was a version to some foods as well.  She denies any chest pain or shortness of breath.      Review of patient's allergies indicates:   Allergen Reactions    Ceftibuten Swelling    Ceftriaxone Swelling     Other reaction(s): Swelling    Shellfish containing products Swelling    Strawberry Swelling    Peanut     Soy     Cefprozil Rash    Iodine Rash     Past Medical History:   Diagnosis Date    Anxiety disorder, unspecified      Past Surgical History:   Procedure Laterality Date    CHOLECYSTECTOMY      TONSILLECTOMY       No family history on file.  Social History     Tobacco Use    Smoking status: Never    Smokeless tobacco: Never     Review of Systems   Constitutional:  Negative for chills, fatigue and fever.   HENT:  Negative for congestion, sore throat and trouble swallowing.    Eyes:  Negative for pain and visual disturbance.   Respiratory:  Negative for cough, shortness of breath and wheezing.    Cardiovascular:  Negative for chest pain and palpitations.    Gastrointestinal:  Positive for abdominal pain. Negative for blood in stool, constipation, diarrhea, nausea and vomiting.   Genitourinary:  Negative for dysuria and hematuria.   Musculoskeletal:  Negative for back pain and myalgias.   Skin:  Negative for rash and wound.   Neurological:  Negative for seizures, syncope and headaches.   Psychiatric/Behavioral:  Negative for confusion. The patient is not nervous/anxious.        Physical Exam     Initial Vitals [10/03/24 1724]   BP Pulse Resp Temp SpO2   135/84 89 20 98.3 °F (36.8 °C) 99 %      MAP       --         Physical Exam    Nursing note and vitals reviewed.  Constitutional: She appears well-developed and well-nourished. She is not diaphoretic. No distress.   HENT:   Head: Normocephalic.   Right Ear: External ear normal.   Left Ear: External ear normal.   Nose: Nose normal.   Eyes: Conjunctivae and EOM are normal. Right eye exhibits no discharge. Left eye exhibits no discharge. No scleral icterus.   Neck:   Normal range of motion.  Cardiovascular:  Normal rate, regular rhythm and normal heart sounds.     Exam reveals no gallop and no friction rub.       No murmur heard.  Pulmonary/Chest: Breath sounds normal. No stridor. No respiratory distress. She has no wheezes. She has no rhonchi. She has no rales.   Abdominal: Abdomen is soft. She exhibits no distension. There is abdominal tenderness. There is no rebound and no guarding.   Musculoskeletal:         General: Normal range of motion.      Cervical back: Normal range of motion.     Neurological: She is alert.   Skin: Skin is warm. No rash noted. No erythema.   Psychiatric: She has a normal mood and affect. Her behavior is normal.         ED Course   Procedures  Labs Reviewed   URINALYSIS, REFLEX TO URINE CULTURE - Abnormal       Result Value    Color, UA Other (*)     Appearance, UA Cloudy (*)     Specific Gravity, UA >=1.030      pH, UA 5.0      Protein, UA 30 (*)     Glucose, UA Negative      Ketones, UA Trace  (*)     Blood, UA Large (*)     Bilirubin, UA Negative      Urobilinogen, UA 1.0      Nitrites, UA Positive (*)     Leukocyte Esterase, UA Small (*)    URINALYSIS, MICROSCOPIC - Abnormal    Bacteria, UA Few (*)     RBC, UA >=100 (*)     WBC, UA 11-20 (*)     Squamous Epithelial Cells, UA Few (*)    COMPREHENSIVE METABOLIC PANEL - Abnormal    Sodium 141      Potassium 3.9      Chloride 108 (*)     CO2 24      Glucose 106 (*)     Blood Urea Nitrogen 10.6      Creatinine 0.97      Calcium 9.8      Protein Total 7.6      Albumin 3.8      Globulin 3.8 (*)     Albumin/Globulin Ratio 1.0 (*)     Bilirubin Total 0.4      ALP 72      ALT 17      AST 18      eGFR >60      Anion Gap 9.0      BUN/Creatinine Ratio 11     CBC WITH DIFFERENTIAL - Abnormal    WBC 10.52      RBC 4.90      Hgb 14.4      Hct 44.0      MCV 89.8      MCH 29.4      MCHC 32.7 (*)     RDW 13.0      Platelet 258      MPV 11.1 (*)     Neut % 56.2      Lymph % 34.6      Mono % 6.8      Eos % 1.7      Basophil % 0.5      Lymph # 3.64      Neut # 5.91      Mono # 0.72      Eos # 0.18      Baso # 0.05      IG# 0.02      IG% 0.2     PREGNANCY TEST, URINE RAPID - Normal    hCG Qualitative, Urine Negative     LIPASE - Normal    Lipase Level 16     CULTURE, URINE   CBC W/ AUTO DIFFERENTIAL    Narrative:     The following orders were created for panel order CBC Auto Differential.  Procedure                               Abnormality         Status                     ---------                               -----------         ------                     CBC with Differential[[id number]]        Abnormal            Final result                 Please view results for these tests on the individual orders.          Imaging Results    None          Medications   ciprofloxacin HCl tablet 500 mg (500 mg Oral Given 10/3/24 7846)     Medical Decision Making  Differentials: Pregnancy, UTI, STI, PID, intra-abdominal infection, viral gastroenteritis, gas pains, constipation,  side-effect from the jewelry patient is wearing   Historian is the patient    Amount and/or Complexity of Data Reviewed  Labs: ordered.               ED Course as of 10/03/24 1908   Thu Oct 03, 2024   1904 Urinalysis, Microscopic(!)  The to multiple allergies we will use a fluoroquinolone to treat the urinary tract infection.  I have advised her that we will have to review cultures and sensitivities once they return. [DB]   1904 I have reviewed the patient's Social Determinants of Health (SDOH), or non-medical factors that may impact their overall health. There are five classifications of SDOH, according to the [place] Office of Disease Prevention and Health Promotion:    Economic stability  Education  Health and healthcare  Neighborhood and built environment  Social and community context    After review of these five areas of determinants, I have not identified any specific barriers to healthcare delivery to this patient at this time.    I have reviewed the nursing notes for the patient and I agree with the assessment in the record. [DB]   1904 The patient is now resting comfortably and feels better after therapies in the ER.  The patient is alert and in no distress. The repeat examination is unremarkable and benign. There is no discomfort at McBurney's point to suggest acute appendicitis.  There is no pulsatile mass or abdominal bruit suggestive of AAA.     The history, exam, diagnostic testing, and current condition also do not suggest a bowel obstruction, acute cholecystitis, bowel perforation, major gastrointestinal bleeding, severe diverticulitis, mesenteric ischemia, volvulus, sepsis, or another significant pathology warrant further testing, continued ED treatment, admission, or surgical evaluation at this point. The vital signs have been stable. The patient does not have uncontrollable pain, intractable vomiting, or other significant symptoms. The patient's condition is stable and appropriate for  discharge from the emergency department.     The patient will pursue a further outpatient evaluation with the primary care physician or other designated or consulting physician, as indicated in the discharge instructions.   [DB]      ED Course User Index  [DB] Sukumar Cooper MD                           Clinical Impression:  Final diagnoses:  [R10.9] Abdominal pain, unspecified abdominal location (Primary)  [N39.0] Urinary tract infection without hematuria, site unspecified          ED Disposition Condition    Discharge Stable          ED Prescriptions    None       Follow-up Information       Follow up With Specialties Details Why Contact Info    Sumi Santos MD Internal Medicine In 1 day For re-evaluation [address] [place] [id number]  [phone]               Sukumar Cooper MD  10/03/24 4223

## 2024-10-04 NOTE — DISCHARGE INSTRUCTIONS
IMPORTANT: EVEN THOUGH WE THINK IT IS SAFE FOR YOU TO GO HOME, NO EVALUATION OR TEST IS PERFECT SO THERE IS ALWAYS A SMALL CHANCE THAT YOU WILL NEED TO RETURN AND BE HOSPITALIZED.  THEREFORE IT IS VERY IMPORTANT THAT IF YOU GET WORSE OR DEVELOP ANY NEW SYMPTOMS THAT YOU RETURN HERE AS SOON AS POSSIBLE TO BE RE-EVALUATED.  THIS INCLUDES THE RETURN OF SYMPTOMS THAT HAVE RESOLVED SUCH AS FAINTING, CHEST PAIN OR SYMPTOMS THAT COULD BE A WARNING FOR A STROKE.     RETURN PRECAUTIONS:         Return here or see/call your doctor if not improving as expected, but return here immediately and/or contact a primary care doctor if you get worse, develop any new symptoms or have any of the following:  - Weakness or changes in speech/vision/coordination   - Shortness of breath, chest pain or fainting  -  One or both legs/feet become swollen, cold or painful  - Vomiting, dark stool, bleeding or trouble urinating  - Fever or chills, or if already present fever >103 or lasting >4-5 days  - New, changing or migrating pain     FOLLOW-UP CARE: We often dont make a definite diagnosis in the ER.  You will need a second opinion if you do not get better and usually even if you do.  Call your doctor and/or any doctors we referred you to for more advice and to make an appointment.  Do this today, tomorrow or after the weekend.  If you have HMO insurance, you may want to contact your HMO or your primary care doctor for referral to a specialist within your plan.  Either way tell the doctors office that it was a referral from the emergency department to get the soonest possible appointment.        YOUR TEST RESULTS: Take result reports of any blood or urine tests, imaging tests and EKGs to your doctor and any referral doctors. Have any abnormal tests repeated.  Your doctor or a referral doctor can let you know when this should be done.  Also make sure your doctor contacts this hospital to get any test results that are not yet available such as  culture results or special tests for infection and final imaging reports, which are often not available at the time you leave the ER but which may list additional important findings that are not on the preliminary report.        BLOOD PRESSURE & GLUCOSE: If your blood pressure was greater than 120/80 or your glucose level was >100 have it rechecked within 1 to 2 weeks.  The stress of your current condition can often cause a temporary rise in blood pressure or glucose level, but many people have undiagnosed hypertension or pre-diabetes.        MEDICATION SIDE EFFECTS: Do not drive, walk, bike, take the bus, etc. if you have received or are being prescribed any sedating medications such as those for pain or anxiety or certain  antihistamines like Benadryl.  If you have been give one of these here, get a taxi home or have a friend drive you home.  Ask your pharmacist to  you on potential side effects of any new medication.  If you are being prescribed antibiotics take a probiotic or eat yogurt and realize birth control pills may be less effective.        WHAT IF I DONT GET BETTER: Even with the best laid treatment plan, there is typically a 5-10% chance that you will not improve as expected or even get worse.  If you get worse you should return here as soon as possible; the wait is typically shortest in the morning.  If you stay the same or only improve partially, instead of returning here it may be reasonable to see a primary care provider for follow-up care and a second opinion.

## 2024-10-05 LAB
BACTERIA UR CULT: ABNORMAL

## 2024-10-14 ENCOUNTER — HOSPITAL ENCOUNTER (EMERGENCY)
Facility: HOSPITAL | Age: 29
Discharge: HOME | End: 2024-10-14
Attending: EMERGENCY MEDICINE
Payer: MEDICAID

## 2024-10-14 VITALS
DIASTOLIC BLOOD PRESSURE: 90 MMHG | OXYGEN SATURATION: 99 % | SYSTOLIC BLOOD PRESSURE: 143 MMHG | HEIGHT: 63 IN | WEIGHT: 271 LBS | RESPIRATION RATE: 20 BRPM | TEMPERATURE: 98 F | HEART RATE: 73 BPM | BODY MASS INDEX: 48.02 KG/M2

## 2024-10-14 DIAGNOSIS — N39.0 URINARY TRACT INFECTION WITHOUT HEMATURIA, SITE UNSPECIFIED: Primary | ICD-10-CM

## 2024-10-14 DIAGNOSIS — N93.9 ABNORMAL UTERINE BLEEDING (AUB): ICD-10-CM

## 2024-10-14 LAB
ALBUMIN SERPL-MCNC: 3.7 G/DL (ref 3.5–5)
ALBUMIN/GLOB SERPL: 1.1 RATIO (ref 1.1–2)
ALP SERPL-CCNC: 69 UNIT/L (ref 40–150)
ALT SERPL-CCNC: 13 UNIT/L (ref 0–55)
ANION GAP SERPL CALC-SCNC: 8 MEQ/L
AST SERPL-CCNC: 17 UNIT/L (ref 5–34)
B-HCG UR QL: NEGATIVE
BACTERIA #/AREA URNS AUTO: ABNORMAL /HPF
BASOPHILS # BLD AUTO: 0.05 X10(3)/MCL
BASOPHILS NFR BLD AUTO: 0.6 %
BILIRUB SERPL-MCNC: 0.4 MG/DL
BILIRUB UR QL STRIP.AUTO: NEGATIVE
BUN SERPL-MCNC: 8.4 MG/DL (ref 7–18.7)
CALCIUM SERPL-MCNC: 9.3 MG/DL (ref 8.4–10.2)
CHLORIDE SERPL-SCNC: 106 MMOL/L (ref 98–107)
CLARITY UR: ABNORMAL
CO2 SERPL-SCNC: 26 MMOL/L (ref 22–29)
COLOR UR AUTO: ABNORMAL
CREAT SERPL-MCNC: 0.84 MG/DL (ref 0.55–1.02)
CREAT/UREA NIT SERPL: 10
EOSINOPHIL # BLD AUTO: 0.17 X10(3)/MCL (ref 0–0.9)
EOSINOPHIL NFR BLD AUTO: 2.1 %
ERYTHROCYTE [DISTWIDTH] IN BLOOD BY AUTOMATED COUNT: 13.2 % (ref 11.5–17)
GFR SERPLBLD CREATININE-BSD FMLA CKD-EPI: >60 ML/MIN/1.73/M2
GLOBULIN SER-MCNC: 3.5 GM/DL (ref 2.4–3.5)
GLUCOSE SERPL-MCNC: 98 MG/DL (ref 74–100)
GLUCOSE UR QL STRIP: NORMAL
HCT VFR BLD AUTO: 41.5 % (ref 37–47)
HGB BLD-MCNC: 13.5 G/DL (ref 12–16)
HGB UR QL STRIP: ABNORMAL
IMM GRANULOCYTES # BLD AUTO: 0.02 X10(3)/MCL (ref 0–0.04)
IMM GRANULOCYTES NFR BLD AUTO: 0.3 %
KETONES UR QL STRIP: NEGATIVE
LEUKOCYTE ESTERASE UR QL STRIP: 250
LYMPHOCYTES # BLD AUTO: 2.68 X10(3)/MCL (ref 0.6–4.6)
LYMPHOCYTES NFR BLD AUTO: 33.9 %
MCH RBC QN AUTO: 29.2 PG (ref 27–31)
MCHC RBC AUTO-ENTMCNC: 32.5 G/DL (ref 33–36)
MCV RBC AUTO: 89.6 FL (ref 80–94)
MONOCYTES # BLD AUTO: 0.59 X10(3)/MCL (ref 0.1–1.3)
MONOCYTES NFR BLD AUTO: 7.5 %
MUCOUS THREADS URNS QL MICRO: ABNORMAL /LPF
NEUTROPHILS # BLD AUTO: 4.4 X10(3)/MCL (ref 2.1–9.2)
NEUTROPHILS NFR BLD AUTO: 55.6 %
NITRITE UR QL STRIP: NEGATIVE
NRBC BLD AUTO-RTO: 0 %
PH UR STRIP: 6 [PH]
PLATELET # BLD AUTO: 240 X10(3)/MCL (ref 130–400)
PMV BLD AUTO: 11 FL (ref 7.4–10.4)
POTASSIUM SERPL-SCNC: 4.2 MMOL/L (ref 3.5–5.1)
PROT SERPL-MCNC: 7.2 GM/DL (ref 6.4–8.3)
PROT UR QL STRIP: ABNORMAL
RBC # BLD AUTO: 4.63 X10(6)/MCL (ref 4.2–5.4)
RBC #/AREA URNS AUTO: >100 /HPF
SODIUM SERPL-SCNC: 140 MMOL/L (ref 136–145)
SP GR UR STRIP.AUTO: 1.03 (ref 1–1.03)
SQUAMOUS #/AREA URNS LPF: ABNORMAL /HPF
UROBILINOGEN UR STRIP-ACNC: NORMAL
WBC # BLD AUTO: 7.91 X10(3)/MCL (ref 4.5–11.5)
WBC #/AREA URNS AUTO: ABNORMAL /HPF

## 2024-10-14 PROCEDURE — 99284 EMERGENCY DEPT VISIT MOD MDM: CPT | Mod: 25

## 2024-10-14 PROCEDURE — 63600175 PHARM REV CODE 636 W HCPCS

## 2024-10-14 PROCEDURE — 81025 URINE PREGNANCY TEST: CPT

## 2024-10-14 PROCEDURE — 87086 URINE CULTURE/COLONY COUNT: CPT

## 2024-10-14 PROCEDURE — 80053 COMPREHEN METABOLIC PANEL: CPT

## 2024-10-14 PROCEDURE — 96372 THER/PROPH/DIAG INJ SC/IM: CPT

## 2024-10-14 PROCEDURE — 81001 URINALYSIS AUTO W/SCOPE: CPT

## 2024-10-14 PROCEDURE — 85025 COMPLETE CBC W/AUTO DIFF WBC: CPT

## 2024-10-14 RX ORDER — KETOROLAC TROMETHAMINE 10 MG/1
10 TABLET, FILM COATED ORAL EVERY 6 HOURS PRN
Qty: 20 TABLET | Refills: 0 | Status: SHIPPED | OUTPATIENT
Start: 2024-10-14 | End: 2024-10-19

## 2024-10-14 RX ORDER — KETOROLAC TROMETHAMINE 30 MG/ML
60 INJECTION, SOLUTION INTRAMUSCULAR; INTRAVENOUS
Status: COMPLETED | OUTPATIENT
Start: 2024-10-14 | End: 2024-10-14

## 2024-10-14 RX ORDER — NITROFURANTOIN 25; 75 MG/1; MG/1
100 CAPSULE ORAL 2 TIMES DAILY
Qty: 10 CAPSULE | Refills: 0 | Status: SHIPPED | OUTPATIENT
Start: 2024-10-14 | End: 2024-10-19

## 2024-10-14 RX ADMIN — KETOROLAC TROMETHAMINE 60 MG: 30 INJECTION, SOLUTION INTRAMUSCULAR at 02:10

## 2024-10-14 NOTE — ED PROVIDER NOTES
Encounter Date: 10/14/2024       History     Chief Complaint   Patient presents with    Abdominal Pain    Vaginal Bleeding     Pt states that she has been having abd pain, vaginal bleeding with nausea with tender breasts for past 5 weeks. Pt denies pregnancy. Hx PCOS     The patient is a 29 y.o. female who presents to the Emergency Department with a chief complaint of generalized abdominal pain. Symptoms began 5 weeks ago and have been constant since onset. Her pain is currently rated as a 5/10 in severity and described as aching with no radiation. Associated symptoms include vaginal bleeding and nausea. Symptoms are aggravated with nothing and there are no alleviating factors. The patient denies fever or chills. She reports taking nothing prior to arrival with no relief of symptoms. No other reported symptoms at this time.      The history is provided by the patient. No  was used.   Abdominal Pain  The current episode started several days ago. The onset of the illness was gradual. The problem has not changed since onset.The abdominal pain is generalized. The abdominal pain does not radiate. The severity of the abdominal pain is 5/10. The abdominal pain is relieved by nothing. The other symptoms of the illness include nausea and vaginal bleeding. The other symptoms of the illness do not include fever, shortness of breath, vomiting, diarrhea or dysuria.   Nausea began 3 to 5 days ago.   Symptoms associated with the illness do not include frequency or back pain.   Vaginal Bleeding  This is a new problem. The current episode started more than 2 days ago. The problem occurs daily. The problem has not changed since onset.Associated symptoms include abdominal pain. Pertinent negatives include no chest pain, no headaches and no shortness of breath. Nothing aggravates the symptoms. Nothing relieves the symptoms. She has tried nothing for the symptoms. The treatment provided no relief.     Review of  patient's allergies indicates:   Allergen Reactions    Ceftibuten Swelling    Ceftriaxone Swelling     Other reaction(s): Swelling    Shellfish containing products Swelling    Strawberry Swelling    Peanut     Soy     Cefprozil Rash    Iodine Rash     Past Medical History:   Diagnosis Date    Anxiety disorder, unspecified      Past Surgical History:   Procedure Laterality Date    CHOLECYSTECTOMY      TONSILLECTOMY       No family history on file.  Social History     Tobacco Use    Smoking status: Never    Smokeless tobacco: Never     Review of Systems   Constitutional:  Negative for fever.   HENT:  Negative for sore throat.    Respiratory:  Negative for shortness of breath.    Cardiovascular:  Negative for chest pain.   Gastrointestinal:  Positive for abdominal pain and nausea. Negative for diarrhea and vomiting.   Genitourinary:  Positive for vaginal bleeding. Negative for dysuria and frequency.   Musculoskeletal:  Negative for back pain.   Skin:  Negative for rash.   Neurological:  Negative for weakness and headaches.   Hematological:  Does not bruise/bleed easily.   All other systems reviewed and are negative.      Physical Exam     Initial Vitals [10/14/24 1014]   BP Pulse Resp Temp SpO2   (!) 143/90 73 20 97.7 °F (36.5 °C) 99 %      MAP       --         Physical Exam    Nursing note and vitals reviewed.  Constitutional: She appears well-developed and well-nourished.   HENT:   Head: Normocephalic.   Right Ear: Hearing and tympanic membrane normal.   Left Ear: Hearing and tympanic membrane normal. Mouth/Throat: Uvula is midline, oropharynx is clear and moist and mucous membranes are normal.   Eyes: Conjunctivae and EOM are normal. Pupils are equal, round, and reactive to light.   Cardiovascular:  Normal rate, regular rhythm, normal heart sounds and normal pulses.           Pulmonary/Chest: Effort normal and breath sounds normal.   Abdominal: Abdomen is soft. Bowel sounds are normal. There is no abdominal  tenderness.     Lymphadenopathy:     She has no cervical adenopathy.   Neurological: She is alert. GCS eye subscore is 4. GCS verbal subscore is 5. GCS motor subscore is 6.   Skin: Skin is warm, dry and intact. Capillary refill takes less than 2 seconds.         ED Course   Procedures  Labs Reviewed   URINALYSIS, REFLEX TO URINE CULTURE - Abnormal       Result Value    Color, UA Light-Orange (*)     Appearance, UA Turbid (*)     Specific Gravity, UA 1.028      pH, UA 6.0      Protein, UA 1+ (*)     Glucose, UA Normal      Ketones, UA Negative      Blood, UA 3+ (*)     Bilirubin, UA Negative      Urobilinogen, UA Normal      Nitrites, UA Negative      Leukocyte Esterase,  (*)     RBC, UA >100 (*)     WBC, UA 11-20 (*)     Bacteria, UA Moderate (*)     Squamous Epithelial Cells, UA Trace      Mucous, UA Few (*)    CBC WITH DIFFERENTIAL - Abnormal    WBC 7.91      RBC 4.63      Hgb 13.5      Hct 41.5      MCV 89.6      MCH 29.2      MCHC 32.5 (*)     RDW 13.2      Platelet 240      MPV 11.0 (*)     Neut % 55.6      Lymph % 33.9      Mono % 7.5      Eos % 2.1      Basophil % 0.6      Lymph # 2.68      Neut # 4.40      Mono # 0.59      Eos # 0.17      Baso # 0.05      IG# 0.02      IG% 0.3      NRBC% 0.0     PREGNANCY TEST, URINE RAPID - Normal    hCG Qualitative, Urine Negative     CULTURE, URINE   CBC W/ AUTO DIFFERENTIAL    Narrative:     The following orders were created for panel order CBC auto differential.  Procedure                               Abnormality         Status                     ---------                               -----------         ------                     CBC with Differential[[id number]]       Abnormal            Final result                 Please view results for these tests on the individual orders.   COMPREHENSIVE METABOLIC PANEL    Sodium 140      Potassium 4.2      Chloride 106      CO2 26      Glucose 98      Blood Urea Nitrogen 8.4      Creatinine 0.84      Calcium 9.3       Protein Total 7.2      Albumin 3.7      Globulin 3.5      Albumin/Globulin Ratio 1.1      Bilirubin Total 0.4      ALP 69      ALT 13      AST 17      eGFR >60      Anion Gap 8.0      BUN/Creatinine Ratio 10            Imaging Results              CT Abdomen Pelvis  Without Contrast (Final result)  Result time 10/14/24 14:47:17      Final result by Yen Morrell MD (10/14/24 14:47:17)                   Impression:      No abnormality seen      Electronically signed by: Abel Morrell  Date:    10/14/2024  Time:    14:47               Narrative:    EXAMINATION:  CT ABDOMEN PELVIS WITHOUT CONTRAST    CLINICAL HISTORY:  Nausea/vomiting;Abdominal pain, acute, nonlocalized;    TECHNIQUE:  Low dose axial images, sagittal and coronal reformations were obtained from the lung bases to the pubic symphysis.  No contrast was administered.  Automatic exposure control is utilized to reduce patient radiation exposure.    COMPARISON:  05/28/2017    FINDINGS:  The lung bases are clear.    The liver appears normal.  No obvious liver mass or lesion is seen.    The patient is status post cholecystectomy..    The pancreas appears normal.  No inflammatory changes are seen in the pancreatic region.    The spleen appears normal and adrenal glands appear normal.  No adrenal nodule is seen.    No nephrolithiasis is seen.  No hydronephrosis is seen.  No hydroureter is seen.  No ureteral stone is seen.    No colitis is seen.  No diverticulitis is seen.  No appendicitis is seen.    No free air seen.  No free fluid is seen.  The urinary bladder appears normal.    Bones show no acute abnormality.    Uterus appears unremarkable.  The right ovary slightly prominent possibly due to follicular cysts                                       Medications   ketorolac injection 60 mg (60 mg Intramuscular Given 10/14/24 0456)     Medical Decision Making  The patient is a 29 y.o. female who presents to the Emergency Department with a chief complaint  of generalized abdominal pain. Symptoms began 5 weeks ago and have been constant since onset. Her pain is currently rated as a 5/10 in severity and described as aching with no radiation. Associated symptoms include vaginal bleeding and nausea. Symptoms are aggravated with nothing and there are no alleviating factors. The patient denies fever or chills. She reports taking nothing prior to arrival with no relief of symptoms. No other reported symptoms at this time.      Judging by the patient's chief complaint and pertinent history, the patient has the following possible differential diagnoses, including but not limited to the following.  Some of these are deemed to be lower likelihood and some more likely based on my physical exam and history combined with possible lab work and/or imaging studies.   Please see the pertinent studies, and refer to the HPI.  Some of these diagnoses will take further evaluation to fully rule out, perhaps as an outpatient and the patient was encouraged to follow up when discharged for more comprehensive evaluation.    appendicitis, biliary disease, diverticulitis,  AAA, ACS, mesenteric ischemia, intraabdominal abcess, retroperitoneal abcess, gastritis, gastroenteritis, hepatitis, hernia, pancreatitis, inflammatory bowel disease, PUD, SBP, nephrolithiasis, DKA, sickle cell crisis, consitpation, GERD, IBS     menses, malignancy, dysfunctional uterine bleeding, fibroids, anticoagulation, medications       Problems Addressed:  Abnormal uterine bleeding (AUB): acute illness or injury  Urinary tract infection without hematuria, site unspecified: acute illness or injury    Amount and/or Complexity of Data Reviewed  Labs: ordered. Decision-making details documented in ED Course.  Radiology: ordered. Decision-making details documented in ED Course.    Risk  Prescription drug management.               ED Course as of 10/14/24 1540   Mon Oct 14, 2024   1353 Bacteria, UA(!): Moderate [LM]   1353 WBC,  UA(!): 11-20 [LM]   1353 Leukocyte Esterase, UA(!): 250 [LM]   1454 CT Abdomen Pelvis  Without Contrast     Impression:     No abnormality seen      [LM]   1528 Patient reports significant improvement in symptoms. Patient has known PCOS. She is having vaginal bleeding but blood counts are normal today. She has follow up scheduled with Dr. Landry. Will treat for UTI. I discussed results in detail with patient including follow up. She is amendable to plan and ready for discharge home. She was given strict er return precautions.  [LM]      ED Course User Index  [LM] Titus Hwang NP                           Clinical Impression:  Final diagnoses:  [N39.0] Urinary tract infection without hematuria, site unspecified (Primary)  [N93.9] Abnormal uterine bleeding (AUB)          ED Disposition Condition    Discharge Stable          ED Prescriptions       Medication Sig Dispense Start Date End Date Auth. Provider    nitrofurantoin, macrocrystal-monohydrate, (MACROBID) 100 MG capsule Take 1 capsule (100 mg total) by mouth 2 (two) times daily. for 5 days 10 capsule 10/14/2024 10/19/2024 Titus Hwang NP    ketorolac (TORADOL) 10 mg tablet Take 1 tablet (10 mg total) by mouth every 6 (six) hours as needed for Pain. 20 tablet 10/14/2024 10/19/2024 Titus Hwang NP          Follow-up Information       Follow up With Specialties Details Why Contact Info    Shiva Landry MD Obstetrics and Gynecology Schedule an appointment as soon as possible for a visit   [address] [facility] [address]  [phone]               Titus Hwang NP  10/14/24 0901

## 2024-10-14 NOTE — FIRST PROVIDER EVALUATION
Medical screening examination initiated.  I have conducted a focused provider triage encounter, findings are as follows:    Brief history of present illness:  29 year old female presents to ER with vaginal bleeding x 5 weeks. +Nausea and generalized abdominal pain. Patient states she has an appointment with Dr. Shiva Landry at the end of this month.     There were no vitals filed for this visit.    Pertinent physical exam:  Awake and alert, NAD    Brief workup plan:  Labs    Preliminary workup initiated; this workup will be continued and followed by the physician or advanced practice provider that is assigned to the patient when roomed.

## 2024-10-15 LAB
BACTERIA UR CULT: ABNORMAL

## 2024-10-29 PROCEDURE — 87624 HPV HI-RISK TYP POOLED RSLT: CPT | Performed by: OBSTETRICS & GYNECOLOGY

## 2024-10-29 PROCEDURE — 87661 TRICHOMONAS VAGINALIS AMPLIF: CPT | Performed by: OBSTETRICS & GYNECOLOGY

## 2024-10-29 PROCEDURE — 87591 N.GONORRHOEAE DNA AMP PROB: CPT | Performed by: OBSTETRICS & GYNECOLOGY

## 2024-10-29 PROCEDURE — 87491 CHLMYD TRACH DNA AMP PROBE: CPT | Performed by: OBSTETRICS & GYNECOLOGY

## 2024-11-25 ENCOUNTER — APPOINTMENT (OUTPATIENT)
Dept: LAB | Facility: HOSPITAL | Age: 29
End: 2024-11-25
Attending: NURSE PRACTITIONER
Payer: MEDICAID

## 2024-11-25 ENCOUNTER — OFFICE VISIT (OUTPATIENT)
Dept: OBSTETRICS AND GYNECOLOGY | Facility: CLINIC | Age: 29
End: 2024-11-25
Payer: MEDICAID

## 2024-11-25 VITALS
BODY MASS INDEX: 47.53 KG/M2 | HEART RATE: 86 BPM | DIASTOLIC BLOOD PRESSURE: 70 MMHG | SYSTOLIC BLOOD PRESSURE: 130 MMHG | WEIGHT: 275 LBS

## 2024-11-25 DIAGNOSIS — Z11.3 ENCOUNTER FOR SCREENING FOR INFECTIONS WITH A PREDOMINANTLY SEXUAL MODE OF TRANSMISSION: ICD-10-CM

## 2024-11-25 DIAGNOSIS — R10.2 PELVIC PAIN: ICD-10-CM

## 2024-11-25 DIAGNOSIS — N92.1 MENORRHAGIA WITH IRREGULAR CYCLE: ICD-10-CM

## 2024-11-25 DIAGNOSIS — R58 EXCESSIVE BLEEDING: Primary | ICD-10-CM

## 2024-11-25 LAB
B-HCG UR QL: NEGATIVE
BASOPHILS # BLD AUTO: 0.04 X10(3)/MCL
BASOPHILS NFR BLD AUTO: 0.4 %
BILIRUB UR QL STRIP: NEGATIVE
CTP QC/QA: YES
EOSINOPHIL # BLD AUTO: 0.22 X10(3)/MCL (ref 0–0.9)
EOSINOPHIL NFR BLD AUTO: 2.4 %
ERYTHROCYTE [DISTWIDTH] IN BLOOD BY AUTOMATED COUNT: 12.8 % (ref 11.5–17)
GLUCOSE UR QL STRIP: NEGATIVE
HCT VFR BLD AUTO: 38 % (ref 37–47)
HGB BLD-MCNC: 11.9 G/DL (ref 12–16)
IMM GRANULOCYTES # BLD AUTO: 0.03 X10(3)/MCL (ref 0–0.04)
IMM GRANULOCYTES NFR BLD AUTO: 0.3 %
KETONES UR QL STRIP: NEGATIVE
LEUKOCYTE ESTERASE UR QL STRIP: POSITIVE
LYMPHOCYTES # BLD AUTO: 2.68 X10(3)/MCL (ref 0.6–4.6)
LYMPHOCYTES NFR BLD AUTO: 29.5 %
MCH RBC QN AUTO: 28.7 PG (ref 27–31)
MCHC RBC AUTO-ENTMCNC: 31.3 G/DL (ref 33–36)
MCV RBC AUTO: 91.8 FL (ref 80–94)
MONOCYTES # BLD AUTO: 0.54 X10(3)/MCL (ref 0.1–1.3)
MONOCYTES NFR BLD AUTO: 6 %
NEUTROPHILS # BLD AUTO: 5.56 X10(3)/MCL (ref 2.1–9.2)
NEUTROPHILS NFR BLD AUTO: 61.4 %
PH, POC UA: 8.5
PLATELET # BLD AUTO: 313 X10(3)/MCL (ref 130–400)
PMV BLD AUTO: 10.7 FL (ref 7.4–10.4)
POC BLOOD, URINE: POSITIVE
POC NITRATES, URINE: NEGATIVE
PROT UR QL STRIP: POSITIVE
RBC # BLD AUTO: 4.14 X10(6)/MCL (ref 4.2–5.4)
SP GR UR STRIP: 1.02 (ref 1–1.03)
T4 FREE SERPL-MCNC: 0.84 NG/DL (ref 0.7–1.48)
TSH SERPL-ACNC: 0.85 UIU/ML (ref 0.35–4.94)
UROBILINOGEN UR STRIP-ACNC: 0.2 (ref 0.1–1.1)
WBC # BLD AUTO: 9.07 X10(3)/MCL (ref 4.5–11.5)

## 2024-11-25 PROCEDURE — 3078F DIAST BP <80 MM HG: CPT | Mod: CPTII,,, | Performed by: NURSE PRACTITIONER

## 2024-11-25 PROCEDURE — 36415 COLL VENOUS BLD VENIPUNCTURE: CPT | Performed by: NURSE PRACTITIONER

## 2024-11-25 PROCEDURE — 99203 OFFICE O/P NEW LOW 30 MIN: CPT | Mod: ,,, | Performed by: NURSE PRACTITIONER

## 2024-11-25 PROCEDURE — 1159F MED LIST DOCD IN RCRD: CPT | Mod: CPTII,,, | Performed by: NURSE PRACTITIONER

## 2024-11-25 PROCEDURE — 81003 URINALYSIS AUTO W/O SCOPE: CPT | Mod: QW,,, | Performed by: NURSE PRACTITIONER

## 2024-11-25 PROCEDURE — 1160F RVW MEDS BY RX/DR IN RCRD: CPT | Mod: CPTII,,, | Performed by: NURSE PRACTITIONER

## 2024-11-25 PROCEDURE — 81025 URINE PREGNANCY TEST: CPT | Mod: ,,, | Performed by: NURSE PRACTITIONER

## 2024-11-25 PROCEDURE — 3075F SYST BP GE 130 - 139MM HG: CPT | Mod: CPTII,,, | Performed by: NURSE PRACTITIONER

## 2024-11-25 PROCEDURE — 3008F BODY MASS INDEX DOCD: CPT | Mod: CPTII,,, | Performed by: NURSE PRACTITIONER

## 2024-11-25 RX ORDER — PREDNISONE 20 MG/1
20 TABLET ORAL
COMMUNITY
Start: 2021-12-27

## 2024-11-25 RX ORDER — NEOMYCIN SULFATE, POLYMYXIN B SULFATE AND DEXAMETHASONE 3.5; 10000; 1 MG/ML; [USP'U]/ML; MG/ML
1 SUSPENSION/ DROPS OPHTHALMIC 2 TIMES DAILY
COMMUNITY

## 2024-11-25 RX ORDER — GABAPENTIN 100 MG/1
100 CAPSULE ORAL
COMMUNITY
Start: 2021-12-27

## 2024-11-25 NOTE — PROGRESS NOTES
Patient ID: [id number]   CC: PROBLEM (C/O IRREGULAR CYCLE)    HPI:   Inez Andrews is a 29 y.o. No obstetric history on file. here today for PROBLEM (C/O IRREGULAR CYCLE)  PT STATED LAST CYCLE WAS 09/09/2024 AND BLED UNTIL LAST WEEK. SHE IS NOT BLEEDING TODAY. Patient's last menstrual period was 09/09/2024.  Past Medical History:  has a past medical history of Anxiety disorder, unspecified and Asthma.  Surgical History:  has a past surgical history that includes Cholecystectomy and Tonsillectomy.  Family History: family history is not on file.  Social History:  reports that she has never smoked. She has never used smokeless tobacco. She reports that she does not currently use alcohol. She reports that she does not use drugs.  Current Outpatient Medications   Medication Sig Dispense Refill    albuterol (PROVENTIL) 2.5 mg /3 mL (0.083 %) nebulizer solution Inhale into the lungs.      budesonide-formoterol 80-4.5 mcg (SYMBICORT) 80-4.5 mcg/actuation HFAA Inhale into the lungs.      cetirizine 10 mg Cap Take 10 mg by mouth.      EPINEPHrine (EPIPEN) 0.3 mg/0.3 mL AtIn Inject 0.3 mg into the muscle.      FLUoxetine 20 MG capsule Take 20 mg by mouth once daily.      gabapentin (NEURONTIN) 100 MG capsule Take 100 mg by mouth.      ibuprofen (ADVIL,MOTRIN) 800 MG tablet Take 1 tablet (800 mg total) by mouth 3 (three) times daily. 60 tablet 0    neomycin-polymyxin-dexamethasone (MAXITROL) 3.5mg/mL-10,000 unit/mL-0.1 % DrpS Place 1 drop into the right eye 2 (two) times daily.      predniSONE (DELTASONE) 20 MG tablet Take 20 mg by mouth.       No current facility-administered medications for this visit.     Patient is allergic to ceftibuten, ceftriaxone, shellfish containing products, strawberry, cefprozil, and iodine.  MENARCHEAL:  BLEEDING SINCE 09/09/2024  Flow: normal  Dysmenorrhea: Yes  If yes: Moderaye  Intermenstrual Bleeding: No  PAP:  Last PAP: 11/4/2024    History of Abnormal PAP Smear: NO  HPV Vaccine:  NO  INTERCOURSE:  Dyspareunia: No  Postcoital Bleeding: No  History of STI: No   If yes, then: No   Current Birth Control Method: none  Sexually Active: yes  No results found for this or any previous visit (from the past 24 hours).    Subjective:   Review of Systems  12 point review of systems conducted, negative except as stated in the history of present illness. See HPI for details.  Objective:     Visit Vitals  /70   Pulse 86   Wt 124.7 kg (275 lb)   LMP 09/09/2024   BMI 47.53 kg/m²     No results found for this or any previous visit (from the past 24 hours).    Physical Exam  Constitutional:  General Appearance : alert, in no acute distress, normal, well nourished.  Neck/Thyroid:  Inspection/Palpation: normal.  Respiratory:  Respiratory Effort: normal.  Breast:  NO BREAST EXAM TODAY  Gastrointestinal:  Abdomen: no masses. no tender, nondistended.  Liver and spleen: normal  Hernias: no hernias present, no inguinal adenopathy.  Genitourinary:  External Genitalia: normal, no lesions.  Vagina: normal appearance, no abnormal discharge, no lesions.  Bladder: no mass, nontender.  Urethra: no erythema or lesions present.  Cervix: no lesions, non tender. ONE SWAB COLLECTED  Uterus: nontender, normal contour, normal mobility, normal size.   Adnexa: no masses, no tenderness.  Anus and Perineum: visually normal.   Chaperone Present  Assessment:       ICD-10-CM ICD-9-CM   1. Excessive bleeding  R58 459.0   2. Menorrhagia with irregular cycle  N92.1 626.2   3. Pelvic pain  R10.2 [id number]   4. Encounter for screening for infections with a predominantly sexual mode of transmission  Z11.3 V74.5     Plan   Excessive bleeding  -     CBC Auto Differential; Future; Expected date: 11/25/2024  -     T4, Free; Future; Expected date: 11/25/2024  -     TSH; Future; Expected date: 11/25/2024  -     Cancel: US Pelvis Complete Non OB; Future; Expected date: 11/25/2024  -     US Pelvis Complete Non OB; Future; Expected date:  11/25/2024    Menorrhagia with irregular cycle  -     POCT Urine Pregnancy  -     MDL Sendout Test    Pelvic pain  -     CBC Auto Differential; Future; Expected date: 11/25/2024  -     T4, Free; Future; Expected date: 11/25/2024  -     TSH; Future; Expected date: 11/25/2024  -     Cancel: US Pelvis Complete Non OB; Future; Expected date: 11/25/2024  -     US Pelvis Complete Non OB; Future; Expected date: 11/25/2024  -     POCT Urinalysis, Dipstick, Automated, W/O Scope    Encounter for screening for infections with a predominantly sexual mode of transmission  -     MDL Sendout Test    Follow up in about 2 weeks (around 12/9/2024) for RESULTS. In addition to their scheduled follow up, the patient has also been instructed to follow up on as needed basis.   DERICK Castro

## 2024-11-26 ENCOUNTER — HOSPITAL ENCOUNTER (OUTPATIENT)
Dept: RADIOLOGY | Facility: HOSPITAL | Age: 29
Discharge: HOME | End: 2024-11-26
Attending: NURSE PRACTITIONER
Payer: MEDICAID

## 2024-11-26 DIAGNOSIS — R10.2 PELVIC PAIN: ICD-10-CM

## 2024-11-26 DIAGNOSIS — R58 EXCESSIVE BLEEDING: ICD-10-CM

## 2024-11-26 PROCEDURE — 76830 TRANSVAGINAL US NON-OB: CPT | Mod: TC

## 2024-11-26 NOTE — PROGRESS NOTES
" Patient ID: [id number]   CC: PROBLEM (C/O IRREGULAR CYCLE)    HPI:   Inez Andrews is a 29 y.o. No obstetric history on file. here today for PROBLEM (C/O IRREGULAR CYCLE)  PT STATED LAST CYCLE WAS 09/09/2024 AND BLED UNTIL LAST WEEK. SHE IS NOT BLEEDING TODAY. PT STATES SHE HAS BEEN SUFFERING FROM PCOS WITH 1-2 CYCLES PER YEAR IN WHICH ARE VERY HEAVY. SHE STATES SHE HAS TRIED OCPS AND METFORMIN BUT IS UNABLE TO TOLERATE BOTH. PT IS NOT BLEEDING TODAY. PT REPORTS SHE HAS CHRONIC PELVIC PAIN AND " I JUST CAN'T TAKE THIS ANYMORE. I HAVE BEEN SUFFERING FOR YEARS WITH NO RELIEPatient's last menstrual period was 09/09/2024.  Past Medical History:  has a past medical history of Anxiety disorder, unspecified and Asthma.  Surgical History:  has a past surgical history that includes Cholecystectomy and Tonsillectomy.  Family History: family history is not on file.  Social History:  reports that she has never smoked. She has never used smokeless tobacco. She reports that she does not currently use alcohol. She reports that she does not use drugs.  Current Outpatient Medications   Medication Sig Dispense Refill    albuterol (PROVENTIL) 2.5 mg /3 mL (0.083 %) nebulizer solution Inhale into the lungs.      budesonide-formoterol 80-4.5 mcg (SYMBICORT) 80-4.5 mcg/actuation HFAA Inhale into the lungs.      cetirizine 10 mg Cap Take 10 mg by mouth.      EPINEPHrine (EPIPEN) 0.3 mg/0.3 mL AtIn Inject 0.3 mg into the muscle.      FLUoxetine 20 MG capsule Take 20 mg by mouth once daily.      gabapentin (NEURONTIN) 100 MG capsule Take 100 mg by mouth.      ibuprofen (ADVIL,MOTRIN) 800 MG tablet Take 1 tablet (800 mg total) by mouth 3 (three) times daily. 60 tablet 0    neomycin-polymyxin-dexamethasone (MAXITROL) 3.5mg/mL-10,000 unit/mL-0.1 % DrpS Place 1 drop into the right eye 2 (two) times daily.      predniSONE (DELTASONE) 20 MG tablet Take 20 mg by mouth.       No current facility-administered medications for this " Verified name and  of patient. Mother of patient notified that letter was sent via [address] She states that she saw the letter and that she will call to schedule a follow up appointment if patient's symptoms do not improve. visit.     Patient is allergic to ceftibuten, ceftriaxone, shellfish containing products, strawberry, cefprozil, and iodine.  MENARCHEAL:  BLEEDING SINCE 09/09/2024  Flow: normal  Dysmenorrhea: Yes  If yes: Moderaye  Intermenstrual Bleeding: No  PAP:  Last PAP: 11/4/2024    History of Abnormal PAP Smear: NO  HPV Vaccine: NO  INTERCOURSE:  Dyspareunia: No  Postcoital Bleeding: No  History of STI: No   If yes, then: No   Current Birth Control Method: none  Sexually Active: yes  No results found for this or any previous visit (from the past 24 hours).    Subjective:   Review of Systems  12 point review of systems conducted, negative except as stated in the history of present illness. See HPI for details.  Objective:     Visit Vitals  /70   Pulse 86   Wt 124.7 kg (275 lb)   LMP 09/09/2024   BMI 47.53 kg/m²     No results found for this or any previous visit (from the past 24 hours).    Physical Exam  Constitutional:  General Appearance : alert, in no acute distress, normal, well nourished.  Neck/Thyroid:  Inspection/Palpation: normal.  Respiratory:  Respiratory Effort: normal.  Breast:  NO BREAST EXAM TODAY  Gastrointestinal:  Abdomen: no masses. no tender, nondistended.  Liver and spleen: normal  Hernias: no hernias present, no inguinal adenopathy.  Genitourinary:  External Genitalia: normal, no lesions.  Vagina: normal appearance, no abnormal discharge, no lesions.  Bladder: no mass, nontender.  Urethra: no erythema or lesions present.  Cervix: no lesions, non tender. ONE SWAB COLLECTED  Uterus: nontender, normal contour, normal mobility, normal size.   Adnexa: no masses, no tenderness.  Anus and Perineum: visually normal.   Chaperone Present  Assessment:       ICD-10-CM ICD-9-CM   1. Excessive bleeding  R58 459.0   2. Menorrhagia with irregular cycle  N92.1 626.2   3. Pelvic pain  R10.2 [id number]   4. Encounter for screening for infections with a predominantly sexual mode of transmission  Z11.3 V74.5     Plan    Excessive bleeding  -     CBC Auto Differential; Future; Expected date: 11/25/2024  -     T4, Free; Future; Expected date: 11/25/2024  -     TSH; Future; Expected date: 11/25/2024  -     Cancel: US Pelvis Complete Non OB; Future; Expected date: 11/25/2024  -     US Pelvis Complete Non OB; Future; Expected date: 11/25/2024    Menorrhagia with irregular cycle  -     POCT Urine Pregnancy  -     MDL Sendout Test    Pelvic pain  -     CBC Auto Differential; Future; Expected date: 11/25/2024  -     T4, Free; Future; Expected date: 11/25/2024  -     TSH; Future; Expected date: 11/25/2024  -     Cancel: US Pelvis Complete Non OB; Future; Expected date: 11/25/2024  -     US Pelvis Complete Non OB; Future; Expected date: 11/25/2024  -     POCT Urinalysis, Dipstick, Automated, W/O Scope    Encounter for screening for infections with a predominantly sexual mode of transmission  -     MDL Sendout Test    Follow up in about 2 weeks (around 12/9/2024) for RESULTS. In addition to their scheduled follow up, the patient has also been instructed to follow up on as needed basis.   DERICK Castro

## 2024-12-10 ENCOUNTER — OFFICE VISIT (OUTPATIENT)
Dept: OBSTETRICS AND GYNECOLOGY | Facility: CLINIC | Age: 29
End: 2024-12-10
Payer: MEDICAID

## 2024-12-10 ENCOUNTER — HOSPITAL ENCOUNTER (EMERGENCY)
Facility: HOSPITAL | Age: 29
Discharge: HOME | End: 2024-12-10
Attending: STUDENT IN AN ORGANIZED HEALTH CARE EDUCATION/TRAINING PROGRAM
Payer: MEDICAID

## 2024-12-10 VITALS
HEIGHT: 62 IN | BODY MASS INDEX: 49.69 KG/M2 | DIASTOLIC BLOOD PRESSURE: 84 MMHG | OXYGEN SATURATION: 100 % | TEMPERATURE: 98 F | SYSTOLIC BLOOD PRESSURE: 147 MMHG | RESPIRATION RATE: 18 BRPM | WEIGHT: 270 LBS | HEART RATE: 87 BPM

## 2024-12-10 VITALS — SYSTOLIC BLOOD PRESSURE: 150 MMHG | DIASTOLIC BLOOD PRESSURE: 82 MMHG

## 2024-12-10 DIAGNOSIS — R11.2 NAUSEA AND VOMITING: ICD-10-CM

## 2024-12-10 DIAGNOSIS — N92.6 IRREGULAR PERIODS: ICD-10-CM

## 2024-12-10 DIAGNOSIS — E28.2 PCOS (POLYCYSTIC OVARIAN SYNDROME): ICD-10-CM

## 2024-12-10 DIAGNOSIS — R10.2 PELVIC PAIN: ICD-10-CM

## 2024-12-10 DIAGNOSIS — R58 EXCESSIVE BLEEDING: Primary | ICD-10-CM

## 2024-12-10 DIAGNOSIS — I88.0 NONSPECIFIC MESENTERIC ADENITIS: Primary | ICD-10-CM

## 2024-12-10 LAB
ALBUMIN SERPL-MCNC: 3.1 G/DL (ref 3.5–5)
ALBUMIN/GLOB SERPL: 0.8 RATIO (ref 1.1–2)
ALP SERPL-CCNC: 64 UNIT/L (ref 40–150)
ALT SERPL-CCNC: 13 UNIT/L (ref 0–55)
ANION GAP SERPL CALC-SCNC: 6 MEQ/L
AST SERPL-CCNC: 15 UNIT/L (ref 5–34)
B-HCG UR QL: NEGATIVE
B-HCG UR QL: NEGATIVE
BACTERIA #/AREA URNS AUTO: ABNORMAL /HPF
BASOPHILS # BLD AUTO: 0.04 X10(3)/MCL
BASOPHILS NFR BLD AUTO: 0.4 %
BILIRUB SERPL-MCNC: 0.3 MG/DL
BILIRUB UR QL STRIP.AUTO: NEGATIVE
BUN SERPL-MCNC: 11.7 MG/DL (ref 7–18.7)
CALCIUM SERPL-MCNC: 9.1 MG/DL (ref 8.4–10.2)
CHLORIDE SERPL-SCNC: 109 MMOL/L (ref 98–107)
CLARITY UR: CLEAR
CO2 SERPL-SCNC: 26 MMOL/L (ref 22–29)
COLOR UR AUTO: NORMAL
CREAT SERPL-MCNC: 0.83 MG/DL (ref 0.55–1.02)
CREAT/UREA NIT SERPL: 14
CTP QC/QA: YES
EOSINOPHIL # BLD AUTO: 0.2 X10(3)/MCL (ref 0–0.9)
EOSINOPHIL NFR BLD AUTO: 2.2 %
ERYTHROCYTE [DISTWIDTH] IN BLOOD BY AUTOMATED COUNT: 12.7 % (ref 11.5–17)
FLUAV AG UPPER RESP QL IA.RAPID: NOT DETECTED
FLUBV AG UPPER RESP QL IA.RAPID: NOT DETECTED
GFR SERPLBLD CREATININE-BSD FMLA CKD-EPI: >60 ML/MIN/1.73/M2
GLOBULIN SER-MCNC: 3.7 GM/DL (ref 2.4–3.5)
GLUCOSE SERPL-MCNC: 112 MG/DL (ref 74–100)
GLUCOSE UR QL STRIP: NEGATIVE
HCT VFR BLD AUTO: 37.4 % (ref 37–47)
HGB BLD-MCNC: 11.7 G/DL (ref 12–16)
HGB UR QL STRIP: NEGATIVE
IMM GRANULOCYTES # BLD AUTO: 0.02 X10(3)/MCL (ref 0–0.04)
IMM GRANULOCYTES NFR BLD AUTO: 0.2 %
KETONES UR QL STRIP: NEGATIVE
LEUKOCYTE ESTERASE UR QL STRIP: NEGATIVE
LIPASE SERPL-CCNC: 21 U/L
LYMPHOCYTES # BLD AUTO: 2.48 X10(3)/MCL (ref 0.6–4.6)
LYMPHOCYTES NFR BLD AUTO: 26.9 %
MAGNESIUM SERPL-MCNC: 2 MG/DL (ref 1.6–2.6)
MCH RBC QN AUTO: 27.9 PG (ref 27–31)
MCHC RBC AUTO-ENTMCNC: 31.3 G/DL (ref 33–36)
MCV RBC AUTO: 89.3 FL (ref 80–94)
MONOCYTES # BLD AUTO: 0.74 X10(3)/MCL (ref 0.1–1.3)
MONOCYTES NFR BLD AUTO: 8 %
NEUTROPHILS # BLD AUTO: 5.73 X10(3)/MCL (ref 2.1–9.2)
NEUTROPHILS NFR BLD AUTO: 62.3 %
NITRITE UR QL STRIP: NEGATIVE
PH UR STRIP: 5.5 [PH]
PLATELET # BLD AUTO: 250 X10(3)/MCL (ref 130–400)
PMV BLD AUTO: 10.5 FL (ref 7.4–10.4)
POCT GLUCOSE: 126 MG/DL (ref 70–110)
POTASSIUM SERPL-SCNC: 4.1 MMOL/L (ref 3.5–5.1)
PROT SERPL-MCNC: 6.8 GM/DL (ref 6.4–8.3)
PROT UR QL STRIP: NEGATIVE
RBC # BLD AUTO: 4.19 X10(6)/MCL (ref 4.2–5.4)
RBC #/AREA URNS AUTO: ABNORMAL /HPF
SARS-COV-2 RNA RESP QL NAA+PROBE: NOT DETECTED
SODIUM SERPL-SCNC: 141 MMOL/L (ref 136–145)
SP GR UR STRIP.AUTO: >=1.03 (ref 1–1.03)
SQUAMOUS #/AREA URNS AUTO: ABNORMAL /HPF
UROBILINOGEN UR STRIP-ACNC: 0.2
WBC # BLD AUTO: 9.21 X10(3)/MCL (ref 4.5–11.5)
WBC #/AREA URNS AUTO: ABNORMAL /HPF

## 2024-12-10 PROCEDURE — 0240U COVID/FLU A&B PCR: CPT | Performed by: STUDENT IN AN ORGANIZED HEALTH CARE EDUCATION/TRAINING PROGRAM

## 2024-12-10 PROCEDURE — 93010 ELECTROCARDIOGRAM REPORT: CPT | Mod: ,,, | Performed by: INTERNAL MEDICINE

## 2024-12-10 PROCEDURE — 25000003 PHARM REV CODE 250: Performed by: STUDENT IN AN ORGANIZED HEALTH CARE EDUCATION/TRAINING PROGRAM

## 2024-12-10 PROCEDURE — 83735 ASSAY OF MAGNESIUM: CPT | Performed by: STUDENT IN AN ORGANIZED HEALTH CARE EDUCATION/TRAINING PROGRAM

## 2024-12-10 PROCEDURE — 99285 EMERGENCY DEPT VISIT HI MDM: CPT | Mod: 25

## 2024-12-10 PROCEDURE — 3077F SYST BP >= 140 MM HG: CPT | Mod: CPTII,,, | Performed by: NURSE PRACTITIONER

## 2024-12-10 PROCEDURE — 83690 ASSAY OF LIPASE: CPT | Performed by: STUDENT IN AN ORGANIZED HEALTH CARE EDUCATION/TRAINING PROGRAM

## 2024-12-10 PROCEDURE — 3079F DIAST BP 80-89 MM HG: CPT | Mod: CPTII,,, | Performed by: NURSE PRACTITIONER

## 2024-12-10 PROCEDURE — 80053 COMPREHEN METABOLIC PANEL: CPT | Performed by: STUDENT IN AN ORGANIZED HEALTH CARE EDUCATION/TRAINING PROGRAM

## 2024-12-10 PROCEDURE — 93005 ELECTROCARDIOGRAM TRACING: CPT

## 2024-12-10 PROCEDURE — 1160F RVW MEDS BY RX/DR IN RCRD: CPT | Mod: CPTII,,, | Performed by: NURSE PRACTITIONER

## 2024-12-10 PROCEDURE — 1159F MED LIST DOCD IN RCRD: CPT | Mod: CPTII,,, | Performed by: NURSE PRACTITIONER

## 2024-12-10 PROCEDURE — 82962 GLUCOSE BLOOD TEST: CPT

## 2024-12-10 PROCEDURE — 81025 URINE PREGNANCY TEST: CPT | Mod: ,,, | Performed by: NURSE PRACTITIONER

## 2024-12-10 PROCEDURE — 96372 THER/PROPH/DIAG INJ SC/IM: CPT | Performed by: STUDENT IN AN ORGANIZED HEALTH CARE EDUCATION/TRAINING PROGRAM

## 2024-12-10 PROCEDURE — 99213 OFFICE O/P EST LOW 20 MIN: CPT | Mod: ,,, | Performed by: NURSE PRACTITIONER

## 2024-12-10 PROCEDURE — 81025 URINE PREGNANCY TEST: CPT | Performed by: STUDENT IN AN ORGANIZED HEALTH CARE EDUCATION/TRAINING PROGRAM

## 2024-12-10 PROCEDURE — 63600175 PHARM REV CODE 636 W HCPCS: Performed by: STUDENT IN AN ORGANIZED HEALTH CARE EDUCATION/TRAINING PROGRAM

## 2024-12-10 PROCEDURE — 81003 URINALYSIS AUTO W/O SCOPE: CPT | Performed by: STUDENT IN AN ORGANIZED HEALTH CARE EDUCATION/TRAINING PROGRAM

## 2024-12-10 PROCEDURE — 85025 COMPLETE CBC W/AUTO DIFF WBC: CPT | Performed by: STUDENT IN AN ORGANIZED HEALTH CARE EDUCATION/TRAINING PROGRAM

## 2024-12-10 RX ORDER — DEXAMETHASONE SODIUM PHOSPHATE 4 MG/ML
10 INJECTION, SOLUTION INTRA-ARTICULAR; INTRALESIONAL; INTRAMUSCULAR; INTRAVENOUS; SOFT TISSUE
Status: COMPLETED | OUTPATIENT
Start: 2024-12-10 | End: 2024-12-10

## 2024-12-10 RX ORDER — ONDANSETRON 4 MG/1
8 TABLET, ORALLY DISINTEGRATING ORAL
Status: COMPLETED | OUTPATIENT
Start: 2024-12-10 | End: 2024-12-10

## 2024-12-10 RX ORDER — IBUPROFEN 400 MG/1
400 TABLET ORAL EVERY 6 HOURS
Qty: 28 TABLET | Refills: 0 | Status: SHIPPED | OUTPATIENT
Start: 2024-12-10 | End: 2024-12-17

## 2024-12-10 RX ORDER — ONDANSETRON 4 MG/1
8 TABLET, FILM COATED ORAL EVERY 6 HOURS
Qty: 20 TABLET | Refills: 0 | Status: SHIPPED | OUTPATIENT
Start: 2024-12-10

## 2024-12-10 RX ORDER — KETOROLAC TROMETHAMINE 30 MG/ML
30 INJECTION, SOLUTION INTRAMUSCULAR; INTRAVENOUS
Status: COMPLETED | OUTPATIENT
Start: 2024-12-10 | End: 2024-12-10

## 2024-12-10 RX ORDER — NORETHINDRONE ACETATE AND ETHINYL ESTRADIOL 1MG-20(21)
1 KIT ORAL DAILY
Qty: 28 TABLET | Refills: 3 | Status: SHIPPED | OUTPATIENT
Start: 2024-12-10

## 2024-12-10 RX ADMIN — ONDANSETRON 8 MG: 4 TABLET, ORALLY DISINTEGRATING ORAL at 01:12

## 2024-12-10 RX ADMIN — KETOROLAC TROMETHAMINE 30 MG: 30 INJECTION, SOLUTION INTRAMUSCULAR at 03:12

## 2024-12-10 RX ADMIN — DEXAMETHASONE SODIUM PHOSPHATE 10 MG: 4 INJECTION, SOLUTION INTRA-ARTICULAR; INTRALESIONAL; INTRAMUSCULAR; INTRAVENOUS; SOFT TISSUE at 03:12

## 2024-12-10 NOTE — PROGRESS NOTES
Patient ID: [id number]   Chief Complaint: Results (PT. PRESENTS TO CLINIC FOR RESULTS OF LABS AND PELVIC U/S.)    HPI:   Inez Andrews is a 29 y.o. No obstetric history on file. here today for Results (PT. PRESENTS TO CLINIC FOR RESULTS OF LABS AND PELVIC U/S.)  Patient's last menstrual period was 10/29/2024 (exact date).  Past Medical History:  has a past medical history of Anxiety disorder, unspecified, Asthma, Nerve damage, and Seasonal allergies.  Surgical History:  has a past surgical history that includes Cholecystectomy; Tonsillectomy; and [place] tooth extraction.  Family History: family history includes Diabetes in her father; Thyroid disease in her mother.  Social History:  reports that she has never smoked. She has never used smokeless tobacco. She reports current alcohol use. She reports that she does not use drugs.  Current Outpatient Medications   Medication Sig Dispense Refill    albuterol (PROVENTIL) 2.5 mg /3 mL (0.083 %) nebulizer solution Inhale into the lungs.      budesonide-formoterol 80-4.5 mcg (SYMBICORT) 80-4.5 mcg/actuation HFAA Inhale into the lungs.      cetirizine 10 mg Cap Take 10 mg by mouth.      EPINEPHrine (EPIPEN) 0.3 mg/0.3 mL AtIn Inject 0.3 mg into the muscle.      FLUoxetine 20 MG capsule Take 20 mg by mouth once daily.      gabapentin (NEURONTIN) 100 MG capsule Take 100 mg by mouth.      ibuprofen (ADVIL,MOTRIN) 800 MG tablet Take 1 tablet (800 mg total) by mouth 3 (three) times daily. 60 tablet 0    neomycin-polymyxin-dexamethasone (MAXITROL) 3.5mg/mL-10,000 unit/mL-0.1 % DrpS Place 1 drop into the right eye 2 (two) times daily.      norethindrone-ethinyl estradiol (JUNEL FE 1/20) 1 mg-20 mcg (21)/75 mg (7) per tablet Take 1 tablet by mouth once daily. 28 tablet 3    ondansetron (ZOFRAN) 4 MG tablet Take 2 tablets (8 mg total) by mouth every 6 (six) hours. 20 tablet 0    predniSONE (DELTASONE) 20 MG tablet Take 20 mg by mouth.       No current facility-administered  medications for this visit.     Patient is allergic to ceftibuten, ceftriaxone, shellfish containing products, strawberry, cefprozil, and iodine.  MENARCHEAL:  IRREGULAR  Dysmenorrhea: No  Intermenstrual Bleeding: Yes  PAP:  Last PAP: 11/4/2024    HPV Vaccine: NO  INTERCOURSE:  Dyspareunia: Yes  Postcoital Bleeding: Yes  History of STI: No  Current Birth Control Method: none  Sexually Active: yes  BREAST HISTORY:   Last Mammogram: N/A  COLONOSCOPY:  Last Colonoscopy:   N/A        No results found for this or any previous visit (from the past 24 hours).      Subjective:   Review of Systems  12 point review of systems conducted, negative except as stated in the history of present illness. See HPI for details.  Objective:     Visit Vitals  BP (!) 150/82 (BP Location: Left arm, Patient Position: Sitting)   LMP 10/29/2024 (Exact Date)     No results found for this or any previous visit (from the past 24 hours).    Physical Exam  Constitutional:  General Appearance : alert, in no acute distress, normal, well nourished.  Neck/Thyroid:  Inspection/Palpation: normal.  Respiratory:  Respiratory Effort: normal.  Breast:  Right: Inspection/palpation: no discharge, no masses present, no nipple retraction, no skin changes, no skin dimpling, no tenderness, no lymphadenopathy, no axillary mass, no axillary tenderness.  Left: Inspection/palpation: no discharge, no masses present, no nipple retraction, no skin changes, no skin dimpling, no tenderness, no lymphadenopathy, no axillary mass, no axillary tenderness.  Gastrointestinal:  Abdomen: no masses. no tender, nondistended.  Liver and spleen: normal  Hernias: no hernias present, no inguinal adenopathy.  Genitourinary:  External Genitalia: normal, no lesions.  Vagina: normal appearance, no abnormal discharge, no lesions.  Bladder: no mass, nontender.  Urethra: no erythema or lesions present.  Cervix: no lesions, non tender.   Uterus: nontender, normal contour, normal mobility,  normal size.   Adnexa: no masses, no tenderness.  Anus and Perineum: visually normal.   Chaperone Present  Assessment:       ICD-10-CM ICD-9-CM   1. Excessive bleeding  R58 459.0   2. Pelvic pain  R10.2 [id number]   3. Irregular periods  N92.6 626.4   4. PCOS (polycystic ovarian syndrome)  E28.2 256.4     Plan   Excessive bleeding    Pelvic pain    Irregular periods  -     POCT Urine Pregnancy    PCOS (polycystic ovarian syndrome)    Other orders  -     norethindrone-ethinyl estradiol (JUNEL FE 1/20) 1 mg-20 mcg (21)/75 mg (7) per tablet; Take 1 tablet by mouth once daily.  Dispense: 28 tablet; Refill: 3      No follow-ups on file. In addition to their scheduled follow up, the patient has also been instructed to follow up on as needed basis.     DERICK Castro

## 2024-12-10 NOTE — ED PROVIDER NOTES
Encounter Date: 12/10/2024      History     Chief Complaint   Patient presents with    Nausea     Pt reports of nausea and shakiness since last week.      Inez Andrews is a 29 y.o. female who presented to [facility] ED on 12/10/2024 with a primary complaint of 1 week of nausea and vomiting, patient states she can not keep any food down.  States she has a history of PCOS and was at her OB this morning, OB stated this was not related to her PCOS.  She also endorses chills last night.  She denies any chest pain or back pain however endorses abdominal pain diffusely throughout.    Past Medical History:  She  has a past medical history of Anxiety disorder, unspecified, Asthma, Nerve damage, and Seasonal allergies.    Past Surgical History:  She  has a past surgical history that includes Cholecystectomy; Tonsillectomy; and [place] tooth extraction.    Allergies:  Review of patient's allergies indicates:   Allergen Reactions    Ceftibuten Swelling    Ceftriaxone Swelling     Other reaction(s): Swelling    Shellfish containing products Swelling    Strawberry Swelling    Cefprozil Rash    Iodine Rash       Family History:  Her family history includes Diabetes in her father; Thyroid disease in her mother.    Social History:  She  reports that she has never smoked. She has never used smokeless tobacco. She reports current alcohol use. She reports that she does not use drugs.    Home Medications:  She has a current medication list which includes the following prescription(s): albuterol, budesonide-formoterol 80-4.5 mcg, cetirizine, epinephrine, fluoxetine, gabapentin, ibuprofen, ibuprofen, neomycin-polymyxin-dexamethasone, norethindrone-ethinyl estradiol, ondansetron, and prednisone, and the following Facility-Administered Medications: dexamethasone and ketorolac.     ROS  Pertinent positives and negatives listed in HPI. All other systems are reviewed and are negative.    Physical Exam     Initial Vitals [12/10/24 1154]   BP  Pulse Resp Temp SpO2   (!) 147/84 87 18 97.9 °F (36.6 °C) 100 %      MAP       --         General:  No acute distress.  Obese  HEENT: Normocephalic, atraumatic. Face symmetric.  Cardiovascular: Regular rate & rhythm, no murmurs rubs or gallops  Pulmonary: Bilateral symmetric chest rise, Non-labored  Abdominal:  nondistended, diffuse tenderness throughout without rebound tenderness.  Hypoactive bowel sounds  Extremities: No clubbing or cyanosis.  1+ pitting edema to the mid-shin  Skin:  Exposed skin is warm & dry.  Neuro:   Patient moves all extremities equally. Sensation intact bilateraly.    ED Course   Procedures  Labs Reviewed   COMPREHENSIVE METABOLIC PANEL - Abnormal       Result Value    Sodium 141      Potassium 4.1      Chloride 109 (*)     CO2 26      Glucose 112 (*)     Blood Urea Nitrogen 11.7      Creatinine 0.83      Calcium 9.1      Protein Total 6.8      Albumin 3.1 (*)     Globulin 3.7 (*)     Albumin/Globulin Ratio 0.8 (*)     Bilirubin Total 0.3      ALP 64      ALT 13      AST 15      eGFR >60      Anion Gap 6.0      BUN/Creatinine Ratio 14     CBC WITH DIFFERENTIAL - Abnormal    WBC 9.21      RBC 4.19 (*)     Hgb 11.7 (*)     Hct 37.4      MCV 89.3      MCH 27.9      MCHC 31.3 (*)     RDW 12.7      Platelet 250      MPV 10.5 (*)     Neut % 62.3      Lymph % 26.9      Mono % 8.0      Eos % 2.2      Basophil % 0.4      Lymph # 2.48      Neut # 5.73      Mono # 0.74      Eos # 0.20      Baso # 0.04      IG# 0.02      IG% 0.2     URINALYSIS, MICROSCOPIC - Abnormal    Bacteria, UA None Seen      RBC, UA None Seen      WBC, UA None Seen      Squamous Epithelial Cells, UA Few (*)    POCT GLUCOSE - Abnormal    POCT Glucose 126 (*)    COVID/FLU A&B PCR - Normal    Influenza A PCR Not Detected      Influenza B PCR Not Detected      SARS-CoV-2 PCR Not Detected      Narrative:     The Xpert Xpress SARS-CoV-2/FLU/RSV plus is a rapid, multiplexed real-time PCR test intended for the simultaneous qualitative  detection and differentiation of SARS-CoV-2, Influenza A, Influenza B, and respiratory syncytial virus (RSV) viral RNA in either nasopharyngeal swab or nasal swab specimens.         LIPASE - Normal    Lipase Level 21     MAGNESIUM - Normal    Magnesium Level 2.00     URINALYSIS, REFLEX TO URINE CULTURE - Normal    Color, UA Straw      Appearance, UA Clear      Specific Gravity, UA >=1.030      pH, UA 5.5      Protein, UA Negative      Glucose, UA Negative      Ketones, UA Negative      Blood, UA Negative      Bilirubin, UA Negative      Urobilinogen, UA 0.2      Nitrites, UA Negative      Leukocyte Esterase, UA Negative     PREGNANCY TEST, URINE RAPID - Normal    hCG Qualitative, Urine Negative     CBC W/ AUTO DIFFERENTIAL    Narrative:     The following orders were created for panel order CBC auto differential.  Procedure                               Abnormality         Status                     ---------                               -----------         ------                     CBC with Differential[[id number]]       Abnormal            Final result                 Please view results for these tests on the individual orders.          Imaging Results              CT Abdomen Pelvis  Without Contrast (Final result)  Result time 12/10/24 14:04:40      Final result by Sumi Herrera MD (12/10/24 14:04:40)                   Impression:      1. Possible mesenteric adenitis.  2. Normal appendix.      Electronically signed by: Sumi Herrera  Date:    12/10/2024  Time:    14:04               Narrative:    EXAMINATION:  CT ABDOMEN PELVIS WITHOUT CONTRAST    CLINICAL HISTORY:  Nausea/vomiting;    TECHNIQUE:  Helically acquired axial images, sagittal and coronal reformations were obtained from the lung bases to the pubic symphysis without the IV administration of contrast.    Automated tube current modulation, weight-based exposure dosing, and/or iterative reconstruction technique utilized to reach lowest  reasonably achievable exposure rate.    DLP: 1632 mGy*cm    COMPARISON:  CT abdomen pelvis 10/14/2024    FINDINGS:  HEART: Normal in size. No pericardial effusion.    LUNG BASES: Well aerated.    LIVER: Normal attenuation. No appreciable focal hepatic lesion.    BILIARY: Gallbladder is surgically absent.    PANCREAS: No inflammatory change.    SPLEEN: Normal in size    ADRENALS: No mass.    KIDNEYS/URETERS: No renal or ureteral calculus. No hydronephrosis.    GI TRACT/MESENTERY: Evaluation of the bowel is limited without contrast.  No evidence of bowel obstruction or inflammation. The appendix is normal.    PERITONEUM: No free fluid.No free air.    LYMPH NODES: Mildly prominent right lower quadrant mesenteric lymph nodes.    VASCULATURE: No significant atherosclerosis or aneurysm.    BLADDER: Normal appearance given degree of distention.    REPRODUCTIVE ORGANS: Normal as visualized.    ABDOMINAL WALL: Unremarkable.    BONES: No acute osseous abnormality.                                       Medications   dexAMETHasone injection 10 mg (has no administration in time range)   ketorolac injection 30 mg (has no administration in time range)   ondansetron disintegrating tablet 8 mg (8 mg Oral Given 12/10/24 1336)     Medical Decision Making    Inez Andrews is a 29 y.o. female who presented to [facility] ED on 12/10/2024 with a primary complaint of 1 week of nausea and vomiting, patient states she can not keep any food down.  States she has a history of PCOS and was at her OB this morning, OB stated this was not related to her PCOS.  She also endorses chills last night.  She denies any chest pain or back pain however endorses abdominal pain diffusely throughout.    Physical exam as above.  All labs unremarkable, CT did show likely mesenteric adenitis.  Patient received Decadron 10 mg as well as Zofran 8 mg and Toradol 30 mg while in the ED symptoms improved.  Patient was discharged home with 7 day scheduled 400 mg  prescription of Motrin as well as Zofran as needed for nausea.    Amount and/or Complexity of Data Reviewed  External Data Reviewed:  Notes, labs  Labs:  All labs that have been ordered have been reviewed and are documented in ED Course.  Radiology: All images that have been ordered have been reviewed and are documented in ED Course.         Ddx: Appendicitis, Diverticulitis, Pancreatitis, Pyelonephritis, AAA, Dissection, MI, Gastric Ulcer, Peptic Ulcer, Urinary retention, among others      The patient is resting comfortably and is in no acute distress.  Patient states that her symptoms have improved after treatment within ER. Discussed test results, shared treatment plan, specific conditions for return, and importance of follow up with patient and family. Advised to complete her treatment with medications listed below as well. The patient understands and agrees with the plan as discussed. Answered all patient questions at this time.She has remained hemodynamically stable throughout the ED course and is appropriate for discharge home.                           Clinical Impression:  Final diagnoses:  [R11.2] Nausea and vomiting  [I88.0] Nonspecific mesenteric adenitis (Primary)            ED Disposition Condition    Discharge Stable          ED Prescriptions       Medication Sig Dispense Start Date End Date Auth. Provider    ondansetron (ZOFRAN) 4 MG tablet Take 2 tablets (8 mg total) by mouth every 6 (six) hours. 20 tablet 12/10/2024 -- Ozzie Delgado MD    ibuprofen (ADVIL,MOTRIN) 400 MG tablet Take 1 tablet (400 mg total) by mouth every 6 (six) hours. Dc your Motrin 800 mg during this 7 day.  Do not take with this prescription. for 7 days 28 tablet 12/10/2024 12/17/2024 Ozzie Delgado MD          Follow-up Information       Follow up With Specialties Details Why Contact Sumi Pineda MD Internal Medicine Schedule an appointment as soon as possible for a visit in 1 day  [address] [place] [address] [address]  [address]   [place] [address]  [phone]      [facility] Emergency Medicine  If symptoms worsen [address] [facility] [id number] [phone]             Ozzie Delgado MD  12/10/24 9583

## 2024-12-19 LAB
OHS QRS DURATION: 80 MS
OHS QTC CALCULATION: 425 MS

## 2025-02-13 ENCOUNTER — HOSPITAL ENCOUNTER (EMERGENCY)
Facility: HOSPITAL | Age: 30
Discharge: HOME | End: 2025-02-13
Attending: EMERGENCY MEDICINE
Payer: MEDICAID

## 2025-02-13 VITALS
SYSTOLIC BLOOD PRESSURE: 122 MMHG | DIASTOLIC BLOOD PRESSURE: 85 MMHG | TEMPERATURE: 98 F | BODY MASS INDEX: 42.33 KG/M2 | HEIGHT: 62 IN | OXYGEN SATURATION: 100 % | WEIGHT: 230 LBS | HEART RATE: 63 BPM | RESPIRATION RATE: 18 BRPM

## 2025-02-13 DIAGNOSIS — R10.9 ABDOMINAL PAIN, UNSPECIFIED ABDOMINAL LOCATION: Primary | ICD-10-CM

## 2025-02-13 LAB
ALBUMIN SERPL-MCNC: 3.1 G/DL (ref 3.5–5)
ALBUMIN/GLOB SERPL: 0.8 RATIO (ref 1.1–2)
ALP SERPL-CCNC: 61 UNIT/L (ref 40–150)
ALT SERPL-CCNC: 16 UNIT/L (ref 0–55)
ANION GAP SERPL CALC-SCNC: 6 MEQ/L
AST SERPL-CCNC: 17 UNIT/L (ref 5–34)
B-HCG UR QL: NEGATIVE
BACTERIA #/AREA URNS AUTO: NORMAL /HPF
BASOPHILS # BLD AUTO: 0.04 X10(3)/MCL
BASOPHILS NFR BLD AUTO: 0.4 %
BILIRUB SERPL-MCNC: 0.1 MG/DL
BILIRUB UR QL STRIP.AUTO: NEGATIVE
BUN SERPL-MCNC: 10.1 MG/DL (ref 7–18.7)
CALCIUM SERPL-MCNC: 8.8 MG/DL (ref 8.4–10.2)
CHLORIDE SERPL-SCNC: 109 MMOL/L (ref 98–107)
CLARITY UR: CLEAR
CO2 SERPL-SCNC: 25 MMOL/L (ref 22–29)
COLOR UR AUTO: ABNORMAL
CREAT SERPL-MCNC: 0.71 MG/DL (ref 0.55–1.02)
CREAT/UREA NIT SERPL: 14
EOSINOPHIL # BLD AUTO: 0.2 X10(3)/MCL (ref 0–0.9)
EOSINOPHIL NFR BLD AUTO: 2.2 %
ERYTHROCYTE [DISTWIDTH] IN BLOOD BY AUTOMATED COUNT: 14.4 % (ref 11.5–17)
GFR SERPLBLD CREATININE-BSD FMLA CKD-EPI: >60 ML/MIN/1.73/M2
GLOBULIN SER-MCNC: 3.8 GM/DL (ref 2.4–3.5)
GLUCOSE SERPL-MCNC: 102 MG/DL (ref 74–100)
GLUCOSE UR QL STRIP: NEGATIVE
HCT VFR BLD AUTO: 37.6 % (ref 37–47)
HGB BLD-MCNC: 11.9 G/DL (ref 12–16)
HGB UR QL STRIP: ABNORMAL
IMM GRANULOCYTES # BLD AUTO: 0.01 X10(3)/MCL (ref 0–0.04)
IMM GRANULOCYTES NFR BLD AUTO: 0.1 %
KETONES UR QL STRIP: NEGATIVE
LEUKOCYTE ESTERASE UR QL STRIP: NEGATIVE
LIPASE SERPL-CCNC: 21 U/L
LYMPHOCYTES # BLD AUTO: 3.29 X10(3)/MCL (ref 0.6–4.6)
LYMPHOCYTES NFR BLD AUTO: 35.6 %
MCH RBC QN AUTO: 27.1 PG (ref 27–31)
MCHC RBC AUTO-ENTMCNC: 31.6 G/DL (ref 33–36)
MCV RBC AUTO: 85.6 FL (ref 80–94)
MONOCYTES # BLD AUTO: 0.61 X10(3)/MCL (ref 0.1–1.3)
MONOCYTES NFR BLD AUTO: 6.6 %
NEUTROPHILS # BLD AUTO: 5.09 X10(3)/MCL (ref 2.1–9.2)
NEUTROPHILS NFR BLD AUTO: 55.1 %
NITRITE UR QL STRIP: NEGATIVE
PH UR STRIP: 6 [PH]
PLATELET # BLD AUTO: 226 X10(3)/MCL (ref 130–400)
PMV BLD AUTO: 10.6 FL (ref 7.4–10.4)
POTASSIUM SERPL-SCNC: 3.7 MMOL/L (ref 3.5–5.1)
PROT SERPL-MCNC: 6.9 GM/DL (ref 6.4–8.3)
PROT UR QL STRIP: NEGATIVE
RBC # BLD AUTO: 4.39 X10(6)/MCL (ref 4.2–5.4)
RBC #/AREA URNS AUTO: NORMAL /HPF
SODIUM SERPL-SCNC: 140 MMOL/L (ref 136–145)
SP GR UR STRIP.AUTO: >=1.03 (ref 1–1.03)
SQUAMOUS #/AREA URNS AUTO: NORMAL /HPF
UROBILINOGEN UR STRIP-ACNC: 1
WBC # BLD AUTO: 9.24 X10(3)/MCL (ref 4.5–11.5)
WBC #/AREA URNS AUTO: NORMAL /HPF

## 2025-02-13 PROCEDURE — 81025 URINE PREGNANCY TEST: CPT | Performed by: PHYSICIAN ASSISTANT

## 2025-02-13 PROCEDURE — 83690 ASSAY OF LIPASE: CPT | Performed by: PHYSICIAN ASSISTANT

## 2025-02-13 PROCEDURE — 80053 COMPREHEN METABOLIC PANEL: CPT | Performed by: PHYSICIAN ASSISTANT

## 2025-02-13 PROCEDURE — 85025 COMPLETE CBC W/AUTO DIFF WBC: CPT | Performed by: PHYSICIAN ASSISTANT

## 2025-02-13 PROCEDURE — 81003 URINALYSIS AUTO W/O SCOPE: CPT | Performed by: PHYSICIAN ASSISTANT

## 2025-02-13 PROCEDURE — 99284 EMERGENCY DEPT VISIT MOD MDM: CPT | Mod: 25

## 2025-02-13 RX ORDER — DICYCLOMINE HYDROCHLORIDE 20 MG/1
20 TABLET ORAL 4 TIMES DAILY
Qty: 40 TABLET | Refills: 0 | Status: SHIPPED | OUTPATIENT
Start: 2025-02-13 | End: 2025-02-23

## 2025-02-13 RX ORDER — PANTOPRAZOLE SODIUM 40 MG/1
40 TABLET, DELAYED RELEASE ORAL DAILY
Qty: 30 TABLET | Refills: 0 | Status: SHIPPED | OUTPATIENT
Start: 2025-02-13

## 2025-02-13 NOTE — ED PROVIDER NOTES
Encounter Date: 2/13/2025       History     Chief Complaint   Patient presents with    Abdominal Pain     C/o generalized abdominal pain that has started one year ago; states she was seen in December in ER and had a CT scan done; is now under the care of a gastroenterologist and insurance will not approve a f/u CT for GI specialist.  Pt is here today for worsening abdominal pain.  Pt c/o nausea, vomiting, no diarrhea, no constipation, no urinary s/s.  LMP was one week ago.     Patient presents to ER today with a complaint of generalized abdominal pain.  This has been ongoing for the past couple of months.  Patient was seen by Gastroenterology in January and was told she needs to repeat a CT with contrast.  Patient states insurance has not approved it yet they state it is too early.  Patient denies any nausea or vomiting at present time.  No diarrhea.        Review of patient's allergies indicates:   Allergen Reactions    Ceftibuten Swelling    Ceftriaxone Swelling     Other reaction(s): Swelling    Shellfish containing products Swelling    Strawberry Swelling    Cefprozil Rash    Iodine Rash     Past Medical History:   Diagnosis Date    Anxiety disorder, unspecified     Asthma     Nerve damage     Seasonal allergies      Past Surgical History:   Procedure Laterality Date    CHOLECYSTECTOMY      TONSILLECTOMY      WISDOM TOOTH EXTRACTION       Family History   Problem Relation Name Age of Onset    Diabetes Father      Thyroid disease Mother       Social History     Tobacco Use    Smoking status: Never    Smokeless tobacco: Never   Substance Use Topics    Alcohol use: Yes     Comment: RARE    Drug use: Never     Review of Systems   Gastrointestinal:  Positive for abdominal pain.   All other systems reviewed and are negative.      Physical Exam     Initial Vitals [02/13/25 1633]   BP Pulse Resp Temp SpO2   (!) 159/71 86 18 98.2 °F (36.8 °C) 98 %      MAP       --         Physical Exam    Nursing note and vitals  reviewed.  Constitutional: She appears well-developed and well-nourished.   HENT:   Head: Normocephalic and atraumatic.   Nose: Nose normal. Mouth/Throat: Oropharynx is clear and moist.   Eyes: Conjunctivae and EOM are normal. Pupils are equal, round, and reactive to light.   Neck: Neck supple.   Normal range of motion.  Cardiovascular:  Normal rate, regular rhythm and intact distal pulses.           Pulmonary/Chest: Breath sounds normal.   Abdominal: Abdomen is soft.   Obese, nontender to palpate.  No rebound or guarding.  Positive bowel sounds all 4 quadrant   Musculoskeletal:         General: Normal range of motion.      Cervical back: Normal range of motion and neck supple.     Neurological: She is alert and oriented to person, place, and time.   Skin: Skin is warm and dry. Capillary refill takes less than 2 seconds.   Psychiatric: She has a normal mood and affect. Her behavior is normal. Judgment and thought content normal.         ED Course   Procedures  Labs Reviewed   COMPREHENSIVE METABOLIC PANEL - Abnormal       Result Value    Sodium 140      Potassium 3.7      Chloride 109 (*)     CO2 25      Glucose 102 (*)     Blood Urea Nitrogen 10.1      Creatinine 0.71      Calcium 8.8      Protein Total 6.9      Albumin 3.1 (*)     Globulin 3.8 (*)     Albumin/Globulin Ratio 0.8 (*)     Bilirubin Total 0.1      ALP 61      ALT 16      AST 17      eGFR >60      Anion Gap 6.0      BUN/Creatinine Ratio 14     URINALYSIS, REFLEX TO URINE CULTURE - Abnormal    Color, UA Straw      Appearance, UA Clear      Specific Gravity, UA >=1.030      pH, UA 6.0      Protein, UA Negative      Glucose, UA Negative      Ketones, UA Negative      Blood, UA Trace (*)     Bilirubin, UA Negative      Urobilinogen, UA 1.0      Nitrites, UA Negative      Leukocyte Esterase, UA Negative     CBC WITH DIFFERENTIAL - Abnormal    WBC 9.24      RBC 4.39      Hgb 11.9 (*)     Hct 37.6      MCV 85.6      MCH 27.1      MCHC 31.6 (*)     RDW 14.4       Platelet 226      MPV 10.6 (*)     Neut % 55.1      Lymph % 35.6      Mono % 6.6      Eos % 2.2      Basophil % 0.4      Imm Grans % 0.1      Neut # 5.09      Lymph # 3.29      Mono # 0.61      Eos # 0.20      Baso # 0.04      Imm Gran # 0.01     LIPASE - Normal    Lipase Level 21     PREGNANCY TEST, URINE RAPID - Normal    hCG Qualitative, Urine Negative     URINALYSIS, MICROSCOPIC - Normal    Bacteria, UA Rare      RBC, UA 0-2      WBC, UA None Seen      Squamous Epithelial Cells, UA Rare     CBC W/ AUTO DIFFERENTIAL    Narrative:     The following orders were created for panel order CBC auto differential.  Procedure                               Abnormality         Status                     ---------                               -----------         ------                     CBC with Differential[[id number]]       Abnormal            Final result                 Please view results for these tests on the individual orders.          Imaging Results              CT Abdomen Pelvis  Without Contrast (Final result)  Result time 02/13/25 18:49:27   Procedure changed from CT Abdomen Pelvis With IV Contrast Routine Oral Contrast (no iv contrast)     Final result by Shirlene Gilbert MD (02/13/25 18:49:27)                   Impression:      No acute abnormality of the abdomen or pelvis.      Electronically signed by: Shirlene Gilbert  Date:    02/13/2025  Time:    18:49               Narrative:    EXAMINATION:  CT ABDOMEN PELVIS WITHOUT CONTRAST    CLINICAL HISTORY:  Abdominal pain, acute, nonlocalized;    TECHNIQUE:  CT imaging was performed of the abdomen and pelvis without intravenous contrast. Dose length product is 1129 mGycm. Automatic exposure control, adjustment of mA/kV or iterative reconstruction technique was used to limit radiation dose.    COMPARISON:  CT abdomen pelvis dated 12/10/2024    FINDINGS:  Assessment of the visceral organs and vasculature is limited by the lack of IV  contrast.    Liver/biliary: No concerning hepatic findings. The gallbladder has been surgically resected.    Pancreas: Normal.    Spleen: Normal.    Adrenals: Normal.    Kidneys, ureters and bladder: No obstructing urinary calculus.  No hydronephrosis.  The bladder is within normal limits.    Reproductive organs: No pelvic masses.    Stomach/bowel: No evidence of bowel obstruction. Normal appendix. No discernible bowel inflammation.    Lymph nodes: No pathologically enlarged lymph node identified with noncontrast technique.    Peritoneum: No ascites or free air.    Vessels: Normal abdominal aortic caliber.    Abdominal wall: Normal.    Lung bases: No consolidation or pleural effusion.    Bones: No acute osseous findings.                                       Medications - No data to display  Medical Decision Making  Amount and/or Complexity of Data Reviewed  Labs: ordered.     Details: CMP chloride 109, glucose 102, albumin 3.1 otherwise normal  UA, UPT both negative  CBC hemoglobin 11.9, MCHC 31.6, MPV 10.6 otherwise normal  Radiology: ordered.     Details: CT abdomen pelvis without contrast was negative for acute process    Risk  Prescription drug management.  Risk Details: All test results discussed with patient.  Advised patient lab work and CT were essentially normal.  Will DC home with Bentyl and Protonix.  She is to follow up with her PCP and gastroenterologist next week.  Patient to return to the ER for worsening symptoms or condition.  Patient verbalized understanding and agrees to treatment plan.                                      Clinical Impression:  Final diagnoses:  [R10.9] Abdominal pain, unspecified abdominal location (Primary)          ED Disposition Condition    Discharge Stable          ED Prescriptions       Medication Sig Dispense Start Date End Date Auth. Provider    dicyclomine (BENTYL) 20 mg tablet Take 1 tablet (20 mg total) by mouth 4 (four) times daily. for 10 days 40 tablet 2/13/2025  2/23/2025 Eloina Green PA    pantoprazole (PROTONIX) 40 MG tablet Take 1 tablet (40 mg total) by mouth once daily. 30 tablet 2/13/2025 -- Eloina Green PA          Follow-up Information       Follow up With Specialties Details Why Contact Info    Sumi Santos MD Internal Medicine   [address] [place] [address] [address]   [place] [address]  [phone]               Eloina Green PA  02/13/25 1928

## 2025-02-14 NOTE — DISCHARGE INSTRUCTIONS
Take the Bentyl 1 tablet 4 times a day as needed for pain.  Take the Protonix 1 tablet daily on an empty stomach.  Follow up with your gastroenterologist next week.  Return to the ER for worsening symptoms or condition.

## 2025-04-01 DIAGNOSIS — Z30.9 ENCOUNTER FOR CONTRACEPTIVE MANAGEMENT, UNSPECIFIED TYPE: Primary | ICD-10-CM

## 2025-04-01 RX ORDER — NORETHINDRONE ACETATE AND ETHINYL ESTRADIOL 1MG-20(21)
1 KIT ORAL
Qty: 28 TABLET | Refills: 3 | Status: SHIPPED | OUTPATIENT
Start: 2025-04-01

## 2025-07-21 DIAGNOSIS — Z30.9 ENCOUNTER FOR CONTRACEPTIVE MANAGEMENT, UNSPECIFIED TYPE: ICD-10-CM

## 2025-07-21 RX ORDER — NORETHINDRONE ACETATE AND ETHINYL ESTRADIOL 1MG-20(21)
1 KIT ORAL
Qty: 28 TABLET | Refills: 3 | Status: SHIPPED | OUTPATIENT
Start: 2025-07-21